# Patient Record
Sex: FEMALE | Race: WHITE
[De-identification: names, ages, dates, MRNs, and addresses within clinical notes are randomized per-mention and may not be internally consistent; named-entity substitution may affect disease eponyms.]

---

## 2017-05-15 ENCOUNTER — HOSPITAL ENCOUNTER (OUTPATIENT)
Dept: HOSPITAL 47 - RADMAMWWP | Age: 77
Discharge: HOME | End: 2017-05-15
Payer: MEDICARE

## 2017-05-15 DIAGNOSIS — Z12.31: Primary | ICD-10-CM

## 2017-05-15 DIAGNOSIS — Z78.0: ICD-10-CM

## 2017-05-15 DIAGNOSIS — M85.80: ICD-10-CM

## 2017-05-15 PROCEDURE — 77080 DXA BONE DENSITY AXIAL: CPT

## 2017-05-15 PROCEDURE — 77063 BREAST TOMOSYNTHESIS BI: CPT

## 2017-05-16 NOTE — BD
EXAMINATION TYPE: MG DEXA axial skeleton.  

 

DATE OF EXAM: 5/15/2017 3:06 PM

 

COMPARISON: NONE

 

CLINICAL HISTORY: 77-year-old female postmenopausal screening

 

Height:  66 IN

Weight:  154 LBS

 

FRAX RISK QUESTIONS:

Alcohol (3 or more units per day):  NO

Family History (Parent hip fracture):  YES MOTHER

Glucocorticoids (More than 3mos):  NO

           (Ex: prednisone, prednisolone, methylprednisolone, dexamethasone, and hydrocortisone).    
     

History of Fracture in Adulthood: NO

Secondary Osteoporosis:

  1.  Type 1 Diabetes: NO

  2.  Hyperthyroidism: NO

  3.  Menopause before 45: YES AGE 41

  4.  Malnutrition: NO

  5.  Chronic liver disease: NO

Rheumatoid Arthritis: NO

Current Tobacco Use: NO

 

RISK FACTORS 

HISTORY OF: 

Surgery to Spine): L SPINE

When: 

Family History of Osteoporosis: YES MOTHER/AUNT(M)

Active: YES

Diet low in dairy products/other sources of calcium:  YES

Postmenopausal woman: AGE 41

Take estrogen and/or progesterone medications: NOT NOW

How long: AGE 42 - 47

Lost more than 2 inches in height since high school: YES 3 "

 

 

 

MEDICATIONS: 

Thyroid Medications:  YES

Which medication: Levothyroxine

How Lon+ YRS

Additional Medications: CALCIUM, VIT D, LEVOTHYROXINE, ABILIFY, PAXIL, XANAX, PROPRANOLOL, BABY ASPIR
IN, CENTRUM SILVER 

 

 

 

EXAM MEASUREMENTS: 

Bone mineral densitometry was performed using the [employer].

PT HAD L-SPINE SURGERY IN 

 

Bone mineral density about the R hip (g/cm2): 0.842

Bone mineral density about the L hip (g/cm2): 0.794

T Score values are as follows:

-----R Neck: -1.4

-----L Neck: -1.8

-----R Total: -1.1

-----L Total: -1.4

Bone mineral density BASELINE

 

 

IMPRESSION:

Osteopenia as indicated by T score values in both hips.

 

There is slightly increased risk of fracture and the patient may be considered 

for treatment. Re-Screen 2-5 years.

 

 

 

 

 

NOTE:  T-SCORE=SD OF THE YOUNG ADULT MEAN.

## 2017-05-17 ENCOUNTER — HOSPITAL ENCOUNTER (OUTPATIENT)
Dept: HOSPITAL 47 - LABWHC1 | Age: 77
Discharge: HOME | End: 2017-05-17
Payer: MEDICARE

## 2017-05-17 DIAGNOSIS — R25.1: ICD-10-CM

## 2017-05-17 DIAGNOSIS — E03.9: Primary | ICD-10-CM

## 2017-05-17 DIAGNOSIS — Z13.1: ICD-10-CM

## 2017-05-17 DIAGNOSIS — E78.5: ICD-10-CM

## 2017-05-17 DIAGNOSIS — F06.4: ICD-10-CM

## 2017-05-17 LAB
ALP SERPL-CCNC: 75 U/L (ref 38–126)
ALT SERPL-CCNC: 36 U/L (ref 9–52)
ANION GAP SERPL CALC-SCNC: 9 MMOL/L
AST SERPL-CCNC: 41 U/L (ref 14–36)
BUN SERPL-SCNC: 11 MG/DL (ref 7–17)
CALCIUM SPEC-MCNC: 8.8 MG/DL (ref 8.4–10.2)
CH: 30.3
CHCM: 32.4
CHLORIDE SERPL-SCNC: 101 MMOL/L (ref 98–107)
CHOLEST SERPL-MCNC: 135 MG/DL (ref ?–200)
CO2 SERPL-SCNC: 28 MMOL/L (ref 22–30)
ERYTHROCYTE [DISTWIDTH] IN BLOOD BY AUTOMATED COUNT: 4.33 M/UL (ref 3.8–5.4)
ERYTHROCYTE [DISTWIDTH] IN BLOOD: 14.2 % (ref 11.5–15.5)
GLUCOSE SERPL-MCNC: 81 MG/DL (ref 74–99)
HCT VFR BLD AUTO: 40.8 % (ref 34–46)
HDLC SERPL-MCNC: 51 MG/DL (ref 40–60)
HDW: 2.24
HEMOGLOBIN A1C: 5.4 % (ref 4.2–6.1)
HGB BLD-MCNC: 13 GM/DL (ref 11.4–16)
MCH RBC QN AUTO: 30 PG (ref 25–35)
MCHC RBC AUTO-ENTMCNC: 31.9 G/DL (ref 31–37)
MCV RBC AUTO: 94.2 FL (ref 80–100)
NON-AFRICAN AMERICAN GFR(MDRD): >60
POTASSIUM SERPL-SCNC: 4 MMOL/L (ref 3.5–5.1)
PROT SERPL-MCNC: 6.8 G/DL (ref 6.3–8.2)
SODIUM SERPL-SCNC: 138 MMOL/L (ref 137–145)
TRIGL SERPL-MCNC: 54 MG/DL (ref ?–150)
WBC # BLD AUTO: 5.3 K/UL (ref 3.8–10.6)

## 2017-05-17 PROCEDURE — 83036 HEMOGLOBIN GLYCOSYLATED A1C: CPT

## 2017-05-17 PROCEDURE — 84443 ASSAY THYROID STIM HORMONE: CPT

## 2017-05-17 PROCEDURE — 80053 COMPREHEN METABOLIC PANEL: CPT

## 2017-05-17 PROCEDURE — 85027 COMPLETE CBC AUTOMATED: CPT

## 2017-05-17 PROCEDURE — 36415 COLL VENOUS BLD VENIPUNCTURE: CPT

## 2017-05-17 PROCEDURE — 80061 LIPID PANEL: CPT

## 2017-05-18 NOTE — MM
Reason for exam: screening  (asymptomatic).

Last mammogram was performed 1 year ago.



History:

Patient is postmenopausal, has history of high-risk lesion on a previous biopsy at

age 60, history of other cancer, and had first child at age 33.

High risk excisional biopsy of the left breast.  Benign excisional biopsy of the 

right breast.



Physical Findings:

A clinical breast exam by your physician is recommended on an annual basis and 

results should be correlated with mammographic findings.



MG 3D Screening Mammo W/Cad

Bilateral CC and MLO view(s) were taken.

Prior study comparison: May 4, 2016, bilateral MG 3d diag mammo w/cad ABELINO.  April 

3, 2015, mammogram.

The breast tissue is extremely dense which could obscure a lesion on mammography. 

Subtle distortion right medial inferior MLO view, may be present previously.





ASSESSMENT: Probably benign, BI-RAD 3



RECOMMENDATION:

Follow-up diagnostic mammogram of the right breast in 6 months.

## 2017-11-17 ENCOUNTER — HOSPITAL ENCOUNTER (OUTPATIENT)
Dept: HOSPITAL 47 - RADMAMWWP | Age: 77
Discharge: HOME | End: 2017-11-17
Attending: INTERNAL MEDICINE
Payer: MEDICARE

## 2017-11-17 DIAGNOSIS — R92.8: Primary | ICD-10-CM

## 2017-11-20 NOTE — MM
Reason for exam: follow-up at short interval from prior study.

Last mammogram was performed 6 months ago.



History:

Patient is postmenopausal, has history of high-risk lesion on a previous biopsy at

age 60, history of other cancer, and had first child at age 33.

High risk excisional biopsy of the left breast.  Benign excisional biopsy of the 

right breast.



Physical Findings:

Nurse did not find any significant physical abnormalities on exam.



MG 3D Diag Mammo W/Cad RT

CC and MLO view(s) were taken of the right breast.

Prior study comparison: May 15, 2017, bilateral MG 3d screening mammo w/cad.  May 

4, 2016, bilateral MG 3d diag mammo w/cad ABELINO.

The breast tissue is extremely dense which could obscure a lesion on mammography.

Finding #1: Stable architectural distortion in the right breast consistent with 

previous surgery.

Finding #2: There are typically benign calcifications in the right breast.



These results were verbally communicated with the patient and result sheet given 

to the patient on 11/17/17.





ASSESSMENT: Benign, BI-RAD 2



RECOMMENDATION:

Follow-up diagnostic mammogram of both breasts in 6 months.

## 2018-04-27 ENCOUNTER — HOSPITAL ENCOUNTER (EMERGENCY)
Dept: HOSPITAL 47 - EC | Age: 78
Discharge: HOME | End: 2018-04-27
Payer: MEDICARE

## 2018-04-27 VITALS
TEMPERATURE: 98 F | RESPIRATION RATE: 18 BRPM | SYSTOLIC BLOOD PRESSURE: 159 MMHG | DIASTOLIC BLOOD PRESSURE: 64 MMHG | HEART RATE: 89 BPM

## 2018-04-27 DIAGNOSIS — G45.4: Primary | ICD-10-CM

## 2018-04-27 DIAGNOSIS — R41.82: ICD-10-CM

## 2018-04-27 DIAGNOSIS — Z79.82: ICD-10-CM

## 2018-04-27 DIAGNOSIS — F32.9: ICD-10-CM

## 2018-04-27 DIAGNOSIS — Z79.899: ICD-10-CM

## 2018-04-27 LAB
ALBUMIN SERPL-MCNC: 4.2 G/DL (ref 3.5–5)
ALP SERPL-CCNC: 106 U/L (ref 38–126)
ALT SERPL-CCNC: 31 U/L (ref 9–52)
ANION GAP SERPL CALC-SCNC: 13 MMOL/L
APTT BLD: 23.9 SEC (ref 22–30)
AST SERPL-CCNC: 35 U/L (ref 14–36)
BASOPHILS # BLD AUTO: 0 K/UL (ref 0–0.2)
BASOPHILS NFR BLD AUTO: 0 %
BUN SERPL-SCNC: 20 MG/DL (ref 7–17)
CALCIUM SPEC-MCNC: 9.4 MG/DL (ref 8.4–10.2)
CHLORIDE SERPL-SCNC: 103 MMOL/L (ref 98–107)
CK SERPL-CCNC: 269 U/L (ref 30–135)
CO2 SERPL-SCNC: 27 MMOL/L (ref 22–30)
EOSINOPHIL # BLD AUTO: 0.2 K/UL (ref 0–0.7)
EOSINOPHIL NFR BLD AUTO: 2 %
ERYTHROCYTE [DISTWIDTH] IN BLOOD BY AUTOMATED COUNT: 4.99 M/UL (ref 3.8–5.4)
ERYTHROCYTE [DISTWIDTH] IN BLOOD: 14.3 % (ref 11.5–15.5)
GLUCOSE SERPL-MCNC: 101 MG/DL (ref 74–99)
HCT VFR BLD AUTO: 45 % (ref 34–46)
HGB BLD-MCNC: 14.7 GM/DL (ref 11.4–16)
INR PPP: 1.1 (ref ?–1.2)
LYMPHOCYTES # SPEC AUTO: 1.7 K/UL (ref 1–4.8)
LYMPHOCYTES NFR SPEC AUTO: 17 %
MCH RBC QN AUTO: 29.4 PG (ref 25–35)
MCHC RBC AUTO-ENTMCNC: 32.6 G/DL (ref 31–37)
MCV RBC AUTO: 90.1 FL (ref 80–100)
MONOCYTES # BLD AUTO: 0.5 K/UL (ref 0–1)
MONOCYTES NFR BLD AUTO: 4 %
NEUTROPHILS # BLD AUTO: 7.5 K/UL (ref 1.3–7.7)
NEUTROPHILS NFR BLD AUTO: 75 %
PLATELET # BLD AUTO: 275 K/UL (ref 150–450)
POTASSIUM SERPL-SCNC: 4.2 MMOL/L (ref 3.5–5.1)
PROT SERPL-MCNC: 7.1 G/DL (ref 6.3–8.2)
PT BLD: 10.4 SEC (ref 9–12)
SODIUM SERPL-SCNC: 143 MMOL/L (ref 137–145)
TROPONIN I SERPL-MCNC: 0.04 NG/ML (ref 0–0.03)
WBC # BLD AUTO: 10.1 K/UL (ref 3.8–10.6)

## 2018-04-27 PROCEDURE — 85730 THROMBOPLASTIN TIME PARTIAL: CPT

## 2018-04-27 PROCEDURE — 84484 ASSAY OF TROPONIN QUANT: CPT

## 2018-04-27 PROCEDURE — 93005 ELECTROCARDIOGRAM TRACING: CPT

## 2018-04-27 PROCEDURE — 70496 CT ANGIOGRAPHY HEAD: CPT

## 2018-04-27 PROCEDURE — 82553 CREATINE MB FRACTION: CPT

## 2018-04-27 PROCEDURE — 96360 HYDRATION IV INFUSION INIT: CPT

## 2018-04-27 PROCEDURE — 70498 CT ANGIOGRAPHY NECK: CPT

## 2018-04-27 PROCEDURE — 85025 COMPLETE CBC W/AUTO DIFF WBC: CPT

## 2018-04-27 PROCEDURE — 36415 COLL VENOUS BLD VENIPUNCTURE: CPT

## 2018-04-27 PROCEDURE — 70450 CT HEAD/BRAIN W/O DYE: CPT

## 2018-04-27 PROCEDURE — 80053 COMPREHEN METABOLIC PANEL: CPT

## 2018-04-27 PROCEDURE — 82550 ASSAY OF CK (CPK): CPT

## 2018-04-27 PROCEDURE — 99285 EMERGENCY DEPT VISIT HI MDM: CPT

## 2018-04-27 PROCEDURE — 85610 PROTHROMBIN TIME: CPT

## 2018-04-27 PROCEDURE — 96361 HYDRATE IV INFUSION ADD-ON: CPT

## 2018-04-27 NOTE — CT
EXAMINATION TYPE: CT brain wo con for TPA

 

DATE OF EXAM: 4/27/2018

 

COMPARISON: NONE

 

HISTORY: confusion

 

CT DLP: 1029.9 mGycm

 

Unenhanced CT of the brain was performed.  

 

The ventricles, basal cisterns and sulci overlying the cerebral convexities demonstrate mild enlargem
ent. 

 

There is no evidence for intracranial hemorrhage or sulcal effacement.  

 

There is decreased attenuation about the periventricular white matter and deep white matter of both c
erebral hemispheres, compatible with chronic small vessel ischemia. Differential diagnosis does inclu
de demyelination. 

 

No mass effects are seen.No midline shift.

 

Osseous calvarium is intact.  

 

If symptoms persist consider MRI.  

 

IMPRESSION:

 

1. Age related atrophic and chronic small vessel ischemic change without acute intracranial process s
een at this time.

## 2018-04-27 NOTE — CT
EXAMINATION TYPE: CT angio head neck

 

DATE OF EXAM: 4/27/2018

 

COMPARISON: NONE

 

HISTORY: ams, confusion, weakness

 

CT DLP: 319.3 mGycm

 

CONTRAST: 

Performed with IV Contrast, patient injected with 65 mL of Isovue 370.

 

Combination Contrast CTA cervical carotids and [place] CTA cervical carotids with 3-D recons
truction

 

Contrast CTA of the cervical carotids was performed 3-D reconstruction imaging obtained at a separate
 workstation.  

 

Right carotid system: Mild plaque is seen of the right common carotid artery.  There is mild plaque a
lso noted at the carotid bulb and proximal ICA.  No significant diameter reduction.  ECA is patent.  
Right vertebral artery appears unremarkable.  

 

Left carotid system: Mild plaque is seen of the left common carotid artery.  There is mild plaque als
o noted at the carotid bulb and proximal ICA.  No significant diameter reduction.  ECA is patent. Lef
t vertebral artery appears unremarkable. 

 

IMPRESSION: 

 

1. No significant diameter reduction to account for the patient's symptoms. 

 

CTA [place] with 3-D reconstruction

 

Contrast CTA of the [place] was performed 3-D reconstruction imaging obtained at a separate 
workstation.  

 

Vertebrobasilar system as well as intracranial portions of the internal carotid arteries and their ma
laina tributaries are patent.  I do not see evidence for sizable aneurysm or vascular malformation.  Pl
ease note MRI provides greater sensitivity and specificity.  Visualized brain appears grossly unremar
kable. 

 

IMPRESSION:

 

1. No significant abnormality.

## 2018-04-27 NOTE — ED
General Adult HPI





- General


Chief complaint: Neuro Symptoms/Deficit


Stated complaint: Memory Loss


Time Seen by Provider: 04/27/18 12:14


Source: family, RN notes reviewed, old records reviewed


Mode of arrival: wheelchair


Limitations: altered mental status





- History of Present Illness


Initial comments: 





This is a 78-year-old female the ER for evaluation per patient does say for 

evaluation regarding episode of memory loss.  Patient has history of colon 

amnesia unrelated known cause.  Patient has no high blood pressure cholesterol 

no diabetes of her breath.  Patient does admit to being mildly anxious 

currently.  Per family patient was left babysitting, when they returned home 

patient was having difficulty with memory unsure of where she was how she got 

there etc.  Patient currently having difficulty with current memory situation.  

Unable to remember any events of today.  In that fact patient's poor strain, 

denies headache denies any other complaints.  Patient does have resting right 

upper extremity tremor





- Related Data


 Home Medications











 Medication  Instructions  Recorded  Confirmed


 


ARIPiprazole [Abilify] 2 mg PO DAILY 04/27/18 04/27/18


 


ARIPiprazole [Abilify] 15 mg PO DAILY 04/27/18 04/27/18


 


Aspirin EC [Ecotrin Low Dose] 81 mg PO DAILY 04/27/18 04/27/18


 


Cholecalciferol [Vitamin D3] 1,000 unit PO DAILY 04/27/18 04/27/18


 


Levothyroxine Sodium [Synthroid] 25 mcg PO DAILY 04/27/18 04/27/18


 


PARoxetine HCL [Paxil Cr] 25 mg PO HS 04/27/18 04/27/18


 


Propranolol HCl [Propranolol HCl 60 mg PO DAILY 04/27/18 04/27/18





ER]   











 Allergies











Allergy/AdvReac Type Severity Reaction Status Date / Time


 


No Known Allergies Allergy   Verified 04/27/18 12:45














Review of Systems


ROS Statement: 


Those systems with pertinent positive or pertinent negative responses have been 

documented in the HPI.





ROS Other: All systems not noted in ROS Statement are negative.





Past Medical History


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


History of Any Multi-Drug Resistant Organisms: None Reported


Past Surgical History: Appendectomy, Hysterectomy


Additional Past Surgical History / Comment(s): cysts removed from breasts


Past Psychological History: Depression


Smoking Status: Never smoker


Past Alcohol Use History: None Reported


Past Drug Use History: None Reported





General Exam





- General Exam Comments


Initial Comments: 





NIH of 0, no focal neurological deficit, patient does have retrograde memory 

loss


Limitations: altered mental status


General appearance: alert, in no apparent distress


Head exam: Present: atraumatic, normocephalic, normal inspection


Eye exam: Present: normal appearance, PERRL, EOMI.  Absent: scleral icterus, 

conjunctival injection, periorbital swelling


ENT exam: Present: normal exam, mucous membranes moist


Neck exam: Present: normal inspection.  Absent: tenderness, meningismus, 

lymphadenopathy


Respiratory exam: Present: normal lung sounds bilaterally.  Absent: respiratory 

distress, wheezes, rales, rhonchi, stridor


Cardiovascular Exam: Present: regular rate, normal rhythm, normal heart sounds.

  Absent: systolic murmur, diastolic murmur, rubs, gallop, clicks


GI/Abdominal exam: Present: soft, normal bowel sounds.  Absent: distended, 

tenderness, guarding, rebound, rigid


Extremities exam: Present: normal inspection, full ROM, normal capillary 

refill.  Absent: tenderness, pedal edema, joint swelling, calf tenderness


Back exam: Present: normal inspection


Neurological exam: Present: alert, oriented X3, CN II-XII intact


Psychiatric exam: Present: normal affect, normal mood


Skin exam: Present: warm, dry, intact, normal color.  Absent: rash





Course


 Vital Signs











  04/27/18 04/27/18





  12:18 13:38


 


Temperature 98.6 F 


 


Pulse Rate 73 71


 


Respiratory 18 20





Rate  


 


Blood Pressure 184/89 180/87


 


O2 Sat by Pulse 99 99





Oximetry  














- Reevaluation(s)


Reevaluation #1: 





04/27/18 12:47


Code stroke page,


Reevaluation #2: 





04/27/18 14:09


Patient improving, still having mild memory lapse from earlier today





EKG Findings





- EKG Comments:


EKG Findings:: EKG shows normal sinus rhythm rate of 70, , QRS 86, QTc 403





Medical Decision Making





- Medical Decision Making





78 female the ER for evaluation, fall retrograde memory loss, transient global 

amnesia, CT CT negative labwork normal.  Patient will be discharged home





- Lab Data


Result diagrams: 


 04/27/18 12:25





 04/27/18 12:25


 Lab Results











  04/27/18 04/27/18 04/27/18 Range/Units





  12:25 12:25 12:25 


 


WBC   10.1   (3.8-10.6)  k/uL


 


RBC   4.99   (3.80-5.40)  m/uL


 


Hgb   14.7   (11.4-16.0)  gm/dL


 


Hct   45.0   (34.0-46.0)  %


 


MCV   90.1   (80.0-100.0)  fL


 


MCH   29.4   (25.0-35.0)  pg


 


MCHC   32.6   (31.0-37.0)  g/dL


 


RDW   14.3   (11.5-15.5)  %


 


Plt Count   275   (150-450)  k/uL


 


Neutrophils %   75   %


 


Lymphocytes %   17   %


 


Monocytes %   4   %


 


Eosinophils %   2   %


 


Basophils %   0   %


 


Neutrophils #   7.5   (1.3-7.7)  k/uL


 


Lymphocytes #   1.7   (1.0-4.8)  k/uL


 


Monocytes #   0.5   (0-1.0)  k/uL


 


Eosinophils #   0.2   (0-0.7)  k/uL


 


Basophils #   0.0   (0-0.2)  k/uL


 


PT     (9.0-12.0)  sec


 


INR     (<1.2)  


 


APTT     (22.0-30.0)  sec


 


Sodium    143  (137-145)  mmol/L


 


Potassium    4.2  (3.5-5.1)  mmol/L


 


Chloride    103  ()  mmol/L


 


Carbon Dioxide    27  (22-30)  mmol/L


 


Anion Gap    13  mmol/L


 


BUN    20 H  (7-17)  mg/dL


 


Creatinine    0.73  (0.52-1.04)  mg/dL


 


Est GFR (CKD-EPI)AfAm    >90  (>60 ml/min/1.73 sqM)  


 


Est GFR (CKD-EPI)NonAf    81  (>60 ml/min/1.73 sqM)  


 


Glucose    101 H  (74-99)  mg/dL


 


Calcium    9.4  (8.4-10.2)  mg/dL


 


Total Bilirubin    0.4  (0.2-1.3)  mg/dL


 


AST    35  (14-36)  U/L


 


ALT    31  (9-52)  U/L


 


Alkaline Phosphatase    106  ()  U/L


 


Total Creatine Kinase  269 H    ()  U/L


 


CK-MB (CK-2)  3.7 H*    (0.0-2.4)  ng/mL


 


CK-MB (CK-2) Rel Index  1.4    


 


Troponin I  0.038 H*    (0.000-0.034)  ng/mL


 


Total Protein    7.1  (6.3-8.2)  g/dL


 


Albumin    4.2  (3.5-5.0)  g/dL














  04/27/18 Range/Units





  12:25 


 


WBC   (3.8-10.6)  k/uL


 


RBC   (3.80-5.40)  m/uL


 


Hgb   (11.4-16.0)  gm/dL


 


Hct   (34.0-46.0)  %


 


MCV   (80.0-100.0)  fL


 


MCH   (25.0-35.0)  pg


 


MCHC   (31.0-37.0)  g/dL


 


RDW   (11.5-15.5)  %


 


Plt Count   (150-450)  k/uL


 


Neutrophils %   %


 


Lymphocytes %   %


 


Monocytes %   %


 


Eosinophils %   %


 


Basophils %   %


 


Neutrophils #   (1.3-7.7)  k/uL


 


Lymphocytes #   (1.0-4.8)  k/uL


 


Monocytes #   (0-1.0)  k/uL


 


Eosinophils #   (0-0.7)  k/uL


 


Basophils #   (0-0.2)  k/uL


 


PT  10.4  (9.0-12.0)  sec


 


INR  1.1  (<1.2)  


 


APTT  23.9  (22.0-30.0)  sec


 


Sodium   (137-145)  mmol/L


 


Potassium   (3.5-5.1)  mmol/L


 


Chloride   ()  mmol/L


 


Carbon Dioxide   (22-30)  mmol/L


 


Anion Gap   mmol/L


 


BUN   (7-17)  mg/dL


 


Creatinine   (0.52-1.04)  mg/dL


 


Est GFR (CKD-EPI)AfAm   (>60 ml/min/1.73 sqM)  


 


Est GFR (CKD-EPI)NonAf   (>60 ml/min/1.73 sqM)  


 


Glucose   (74-99)  mg/dL


 


Calcium   (8.4-10.2)  mg/dL


 


Total Bilirubin   (0.2-1.3)  mg/dL


 


AST   (14-36)  U/L


 


ALT   (9-52)  U/L


 


Alkaline Phosphatase   ()  U/L


 


Total Creatine Kinase   ()  U/L


 


CK-MB (CK-2)   (0.0-2.4)  ng/mL


 


CK-MB (CK-2) Rel Index   


 


Troponin I   (0.000-0.034)  ng/mL


 


Total Protein   (6.3-8.2)  g/dL


 


Albumin   (3.5-5.0)  g/dL














- Radiology Data


Radiology results: report reviewed (CT brain CTA head and neck negative), image 

reviewed





Disposition


Clinical Impression: 


 Transient global amnesia





Disposition: HOME SELF-CARE


Condition: Good


Instructions:  Transient Global Amnesia (ED)


Is patient prescribed a controlled substance at d/c from ED?: No


Referrals: 


Thanh Suárez MD [Primary Care Provider] - 1-2 days

## 2018-05-07 ENCOUNTER — HOSPITAL ENCOUNTER (OUTPATIENT)
Dept: HOSPITAL 47 - RADMAMWWP | Age: 78
Discharge: HOME | End: 2018-05-07
Attending: INTERNAL MEDICINE
Payer: MEDICARE

## 2018-05-07 DIAGNOSIS — R92.8: Primary | ICD-10-CM

## 2018-05-07 PROCEDURE — 77062 BREAST TOMOSYNTHESIS BI: CPT

## 2018-05-07 PROCEDURE — 77066 DX MAMMO INCL CAD BI: CPT

## 2018-05-08 NOTE — MM
Reason for exam: additional evaluation requested from prior study.

Last mammogram was performed 6 months ago.



History:

Patient is postmenopausal, has history of high-risk lesion on a previous biopsy at

age 60, history of other cancer, and had first child at age 33.

High risk excisional biopsy of the left breast.  Benign excisional biopsy of the 

right breast.



Physical Findings:

Nurse did not find any significant physical abnormalities on exam.



MG 3D Diag Mammo W/Cad ABELINO

Bilateral CC and MLO view(s) were taken.

Prior study comparison: November 17, 2017, right breast MG 3d diag mammo w/cad RT.

May 15, 2017, bilateral MG 3d screening mammo w/cad.

The breast tissue is extremely dense which could obscure a lesion on mammography.

Finding #1: There is stable architectural distortion in the outer quadrant of the 

right breast.

Finding #2: There are typically benign vascular, round calcifications in both 

breasts. There is no discrete abnormality.



These results were verbally communicated with the patient and result sheet given 

to the patient on 5/7/18.





ASSESSMENT: Benign, BI-RAD 2



RECOMMENDATION:

Routine screening mammogram of both breasts in 1 year.

## 2018-05-09 ENCOUNTER — HOSPITAL ENCOUNTER (OUTPATIENT)
Dept: HOSPITAL 47 - NEUROMAIN | Age: 78
End: 2018-05-09
Attending: INTERNAL MEDICINE
Payer: MEDICARE

## 2018-05-09 DIAGNOSIS — G45.4: Primary | ICD-10-CM

## 2018-05-09 PROCEDURE — 95819 EEG AWAKE AND ASLEEP: CPT

## 2018-05-09 NOTE — EEG
ELECTROENCEPHALOGRAM REPORT



DATE OF SERVICE:

05/09/2018.



REASON FOR TESTING:

Altered mental status.



DESCRIPTION OF THE PROCEDURE:

This EEG was performed using a 21 channel digital electroencephalograph, following

international 10-20 system.



DESCRIPTION OF THE RECORDING:

From the beginning of the tracing, with patient's eyes closed, the background rhythm

was mostly consisting of 9-10 Hz alpha frequency in the posterior occipital leads.  No

obvious asymmetry is seen.  Photic stimulation was performed with a minimal driving

response seen.  No pathological waves were elicited.  Hyperventilation was not

performed.  The patient remains awake throughout the tracing.  No epileptiform

discharges were seen.  Her EKG lead showed a regular rate and rhythm.



INTERPRETATION:

This awake EEG can be considered within normal limits.  There was no asymmetry seen.

No epileptiform discharges were noticed.  The absence of epileptiform discharges does

not rule out the diagnosis of epilepsy, therefore clinical correlation is recommended.



RECOMMENDATION:

Thank you, Dr. Suárez for allowing me to participate in the care of your patient.  If

you have any questions, please feel free to contact me.





MMEMERY / IJN: [id number] / Job#: [id number]

## 2018-05-25 ENCOUNTER — HOSPITAL ENCOUNTER (EMERGENCY)
Dept: HOSPITAL 47 - EC | Age: 78
Discharge: HOME | End: 2018-05-25
Payer: COMMERCIAL

## 2018-05-25 VITALS — RESPIRATION RATE: 18 BRPM | HEART RATE: 78 BPM | TEMPERATURE: 98.1 F

## 2018-05-25 VITALS — DIASTOLIC BLOOD PRESSURE: 78 MMHG | SYSTOLIC BLOOD PRESSURE: 168 MMHG
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
## 2018-05-25 DIAGNOSIS — R40.2412: ICD-10-CM
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
## 2018-05-25 DIAGNOSIS — G31.9: ICD-10-CM

## 2018-05-25 DIAGNOSIS — Z79.899: ICD-10-CM

## 2018-05-25 DIAGNOSIS — I10: ICD-10-CM

## 2018-05-25 DIAGNOSIS — S09.90XA: ICD-10-CM

## 2018-05-25 DIAGNOSIS — F32.9: ICD-10-CM

## 2018-05-25 DIAGNOSIS — S01.112A: Primary | ICD-10-CM

## 2018-05-25 DIAGNOSIS — E07.9: ICD-10-CM

## 2018-05-25 DIAGNOSIS — Z79.82: ICD-10-CM

## 2018-05-25 PROCEDURE — 70486 CT MAXILLOFACIAL W/O DYE: CPT

## 2018-05-25 PROCEDURE — 99284 EMERGENCY DEPT VISIT MOD MDM: CPT

## 2018-05-25 PROCEDURE — 70450 CT HEAD/BRAIN W/O DYE: CPT

## 2018-05-25 PROCEDURE — 12011 RPR F/E/E/N/L/M 2.5 CM/<: CPT

## 2018-05-25 PROCEDURE — 72125 CT NECK SPINE W/O DYE: CPT
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
## 2018-07-19 ENCOUNTER — HOSPITAL ENCOUNTER (OUTPATIENT)
Dept: HOSPITAL 47 - RADXRMAIN | Age: 78
Discharge: HOME | End: 2018-07-19
Payer: MEDICARE

## 2018-07-19 DIAGNOSIS — M79.602: ICD-10-CM

## 2018-07-19 DIAGNOSIS — M11.232: Primary | ICD-10-CM

## 2018-07-19 NOTE — XR
EXAMINATION TYPE: XR forearm LT

 

DATE OF EXAM: 7/19/2018

 

COMPARISON: NONE

 

HISTORY: Pain

 

TECHNIQUE: 2 views

 

FINDINGS: I see no fracture nor dislocation. Radius and ulna appear intact.

 

IMPRESSION: Negative left forearm exam.

## 2018-07-19 NOTE — XR
EXAMINATION TYPE: XR humerus LT

 

DATE OF EXAM: 7/19/2018

 

COMPARISON: NONE

 

HISTORY: Arm pain

 

TECHNIQUE: 2 views

 

FINDINGS: There is no sign of fracture nor dislocation. Shoulder joint and elbow joint appear intact.


 

IMPRESSION: Negative left humerus exam

## 2018-07-19 NOTE — XR
EXAMINATION TYPE: XR wrist complete LT

 

DATE OF EXAM: 7/19/2018

 

COMPARISON: NONE

 

HISTORY: Fall. Wrist pain.

 

TECHNIQUE: 4 views.

 

FINDINGS: I see no fracture nor dislocation. There is calcification of the triangular cartilage. Ther
e are no erosions.

 

IMPRESSION: No fracture. Chondrocalcinosis.

## 2019-01-18 ENCOUNTER — HOSPITAL ENCOUNTER (OUTPATIENT)
Dept: HOSPITAL 47 - RADXRMAIN | Age: 79
Discharge: HOME | End: 2019-01-18
Attending: INTERNAL MEDICINE
Payer: COMMERCIAL

## 2019-01-18 DIAGNOSIS — S82.402D: Primary | ICD-10-CM

## 2019-01-18 NOTE — XR
EXAMINATION TYPE: XR tibia fibula LT

 

DATE OF EXAM: 1/18/2019

 

CLINICAL HISTORY: Fracture

 

TECHNIQUE:  Two views of the left leg are obtained.

 

COMPARISON: None at this location

 

FINDINGS:

 

There is a healing fracture with callus formation at the distal diaphyseal left fibula. Joint spaces 
appear preserved. Joint prosthesis is noted at the knee. Soft tissues are unremarkable.

 

IMPRESSION:

1. Healing fracture distal diaphyseal fibula.

## 2019-02-04 ENCOUNTER — HOSPITAL ENCOUNTER (OUTPATIENT)
Dept: HOSPITAL 47 - RADUSMAIN | Age: 79
Discharge: HOME | End: 2019-02-04
Attending: ORTHOPAEDIC SURGERY
Payer: COMMERCIAL

## 2019-02-04 DIAGNOSIS — R60.9: ICD-10-CM

## 2019-02-04 DIAGNOSIS — M25.572: Primary | ICD-10-CM

## 2019-02-04 NOTE — US
EXAMINATION TYPE: US venous doppler duplex LE LT

 

DATE OF EXAM: 2/4/2019 6:21 PM

 

COMPARISON: NONE

 

CLINICAL HISTORY: M25.572 lle pain and swelling. Left leg pain.

 

SIDE PERFORMED: Left  

 

TECHNIQUE:  The lower extremity deep venous system is examined utilizing real time linear array sonog
sebas with graded compression, doppler sonography and color-flow sonography.

 

VESSELS IMAGED:

External Iliac Vein (EIV)

Common Femoral Vein

Deep Femoral Vein

Greater Saphenous Vein *

Femoral Vein

Popliteal Vein

Small Saphenous Vein *

Proximal Calf Veins

(* superficial vessels)

 

 

Left Leg:  Negative for DVT

 

Grayscale, color doppler, spectral doppler imaging performed of the deep veins of the left lower extr
emity.  There is normal flow, compressibility, vascular waveforms.

 

IMPRESSION:  No ultrasound evidence for acute DVT in the left lower extremity.

## 2019-02-19 ENCOUNTER — HOSPITAL ENCOUNTER (OUTPATIENT)
Dept: HOSPITAL 47 - RADFLMAIN | Age: 79
Discharge: HOME | End: 2019-02-19
Attending: OTOLARYNGOLOGY
Payer: COMMERCIAL

## 2019-02-19 DIAGNOSIS — J39.2: Primary | ICD-10-CM

## 2019-02-19 PROCEDURE — 74230 X-RAY XM SWLNG FUNCJ C+: CPT

## 2019-04-15 ENCOUNTER — HOSPITAL ENCOUNTER (OUTPATIENT)
Dept: HOSPITAL 47 - RADUSWWP | Age: 79
Discharge: HOME | End: 2019-04-15
Attending: ORTHOPAEDIC SURGERY
Payer: MEDICARE

## 2019-04-15 DIAGNOSIS — M79.662: Primary | ICD-10-CM

## 2019-04-15 DIAGNOSIS — R60.9: ICD-10-CM

## 2019-04-15 DIAGNOSIS — I80.3: ICD-10-CM

## 2019-04-15 DIAGNOSIS — S82.62XD: ICD-10-CM

## 2019-04-15 NOTE — US
EXAMINATION TYPE: US venous doppler duplex LE LT

 

DATE OF EXAM: 4/15/2019 4:11 PM

 

COMPARISON: Left lower extremity venous ultrasound February 4, 2019

 

CLINICAL HISTORY: M25.572/R60.9/S82.62XD/Evaluate DVT.

 

SIDE PERFORMED: Left  

 

TECHNIQUE:  The lower extremity deep venous system is examined utilizing real time linear array sonog
sebas with graded compression, doppler sonography and color-flow sonography.

 

VESSELS IMAGED:

External Iliac Vein (EIV)

Common Femoral Vein

Deep Femoral Vein

Greater Saphenous Vein *

Femoral Vein

Popliteal Vein

Small Saphenous Vein *

Proximal Calf Veins

(* superficial vessels)

 

 

 

Left Leg:  Appears negative for DVT.

Preliminary given to Megan at 4:15.

 

Grayscale, color doppler, spectral doppler imaging performed of the deep veins of the left lower extr
emity.  There is normal flow, compressibility, vascular waveforms.

 

IMPRESSION:  No ultrasound evidence for acute DVT in the left lower extremity. No significant change 
from prior.

## 2019-07-01 ENCOUNTER — HOSPITAL ENCOUNTER (OUTPATIENT)
Dept: HOSPITAL 47 - RADMAMWWP | Age: 79
Discharge: HOME | End: 2019-07-01
Attending: INTERNAL MEDICINE
Payer: MEDICARE

## 2019-07-01 DIAGNOSIS — Z12.31: Primary | ICD-10-CM

## 2019-07-01 PROCEDURE — 77063 BREAST TOMOSYNTHESIS BI: CPT

## 2019-07-01 PROCEDURE — 77067 SCR MAMMO BI INCL CAD: CPT

## 2019-07-03 NOTE — MM
Reason for exam: screening  (asymptomatic).

Last mammogram was performed 1 year and 2 months ago.



History:

Patient is postmenopausal, has history of high-risk lesion on a previous biopsy at

age 60, history of other cancer, and had first child at age 33.

High risk excisional biopsy of the left breast.  Benign excisional biopsy of the 

right breast.



Physical Findings:

A clinical breast exam by your physician is recommended on an annual basis and 

results should be correlated with mammographic findings.



MG 3D Screening Mammo W/Cad

Bilateral CC and MLO view(s) were taken.

Prior study comparison: May 7, 2018, bilateral MG 3d diag mammo w/cad AB[facility]INO.  

November 17, 2017, right breast MG 3d diag mammo w/cad RT.

The breast tissue is heterogeneously dense. This may lower the sensitivity of 

mammography.  No significant changes when compared with prior studies.





ASSESSMENT: Negative, BI-RAD 1



RECOMMENDATION:

Routine screening mammogram of both breasts in 1 year.

Patient should continue monthly self breast exams.

A negative report should not preclude additional follow up of suspicious palpable 

abnormalities.

## 2019-07-22 ENCOUNTER — HOSPITAL ENCOUNTER (OUTPATIENT)
Dept: HOSPITAL 47 - RADBDWWP | Age: 79
Discharge: HOME | End: 2019-07-22
Attending: INTERNAL MEDICINE
Payer: MEDICARE

## 2019-07-22 DIAGNOSIS — Z78.0: ICD-10-CM

## 2019-07-22 DIAGNOSIS — M85.88: Primary | ICD-10-CM

## 2019-07-22 PROCEDURE — 77080 DXA BONE DENSITY AXIAL: CPT
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
## 2020-08-17 ENCOUNTER — HOSPITAL ENCOUNTER (OUTPATIENT)
Dept: HOSPITAL 47 - RADMAMWWP | Age: 80
Discharge: HOME | End: 2020-08-17
Payer: MEDICARE

## 2020-08-17 DIAGNOSIS — Z12.31: Primary | ICD-10-CM

## 2020-08-17 PROCEDURE — 77063 BREAST TOMOSYNTHESIS BI: CPT

## 2020-08-17 PROCEDURE — 77067 SCR MAMMO BI INCL CAD: CPT

## 2020-08-19 NOTE — MM
Reason for exam: screening  (asymptomatic).

Last mammogram was performed 1 year and 2 months ago.



History:

Patient is postmenopausal, has history of high-risk lesion on a previous biopsy at

age 60, history of other cancer, and had first child at age 33.

High risk excisional biopsy of the left breast.  Benign excisional biopsy of the 

right breast.



Physical Findings:

A clinical breast exam by your physician is recommended on an annual basis and 

results should be correlated with mammographic findings.



MG 3D Screening Mammo W/Cad

Bilateral CC and MLO view(s) were taken.

Prior study comparison: July 1, 2019, bilateral MG 3d screening mammo w/cad.  May 

7, 2018, bilateral MG 3d diag mammo w/cad [facility].

The breast tissue is extremely dense which could obscure a lesion on mammography. 

Benign appearing bilateral calcifications.  No significant changes when compared 

with prior studies.





ASSESSMENT: Benign, BI-RAD 2



RECOMMENDATION:

Routine screening mammogram of both breasts in 1 year.

## 2020-09-10 ENCOUNTER — HOSPITAL ENCOUNTER (OUTPATIENT)
Dept: HOSPITAL 47 - RADCTMAIN | Age: 80
Discharge: HOME | End: 2020-09-10
Payer: MEDICARE

## 2020-09-10 DIAGNOSIS — G31.1: ICD-10-CM

## 2020-09-10 DIAGNOSIS — I67.82: Primary | ICD-10-CM

## 2020-09-10 DIAGNOSIS — R41.0: ICD-10-CM

## 2020-09-10 DIAGNOSIS — Z20.828: ICD-10-CM

## 2020-09-10 PROCEDURE — 70450 CT HEAD/BRAIN W/O DYE: CPT
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
## 2020-11-03 ENCOUNTER — HOSPITAL ENCOUNTER (OUTPATIENT)
Dept: HOSPITAL 47 - RADNMMAIN | Age: 80
Discharge: HOME | End: 2020-11-03
Attending: INTERNAL MEDICINE
Payer: MEDICARE

## 2020-11-03 DIAGNOSIS — R07.9: Primary | ICD-10-CM

## 2020-11-03 PROCEDURE — 93351 STRESS TTE COMPLETE: CPT

## 2020-11-03 NOTE — ECHOS
STRESS ECHOCARDIOGRAM



BASELINE HEART RATE:

70



BASELINE BLOOD PRESSURE:

133/74



MAXIMUM HEART RATE:

134



MAXIMUM BLOOD PRESSURE:

152/55



85% MPHR:

121



100% MPHR:

142



MAXIMUM STAGE REACHED:

3



TOTAL EXERCISE TIME:

7:54



CLINICAL INFORMATION:

Baseline EKG revealed normal sinus rhythm without significant ST changes.  With

dobutamine administration as per protocol, the heart rate went up to 134 beats per

minute which is well above 85% of predicted maximal.  She did not have any significant

symptoms.  EKG did not reveal any ST-segment changes to indicate ischemia.  Upsloping

nonspecific ST-segment changes with rare PVCs were noted.  By EKG criteria, this is

unremarkable dobutamine stress test with a heart rate of more than 85% of the predicted

maximal.



This is unremarkable dobutamine stress test by EKG criteria.



Baseline echo images revealed normal wall motion wall thickening of all segments.



With dobutamine administration, there was progressive increase in contractility of all

segments suggesting that there is no evidence of stress-induced ischemia on this study.



FINAL IMPRESSION:

1. By EKG criteria, this is an unremarkable dobutamine stress test without any

    evidence of ischemia or angina.

2. Normal dobutamine stress echocardiogram.  There is no evidence to suggest stress

    induced ischemia on this study.





MMODL / IJN: [id number] / Job#: [id number]

## 2020-11-09 ENCOUNTER — HOSPITAL ENCOUNTER (OUTPATIENT)
Dept: HOSPITAL 47 - RADMRIMAIN | Age: 80
Discharge: HOME | End: 2020-11-09
Attending: PSYCHIATRY & NEUROLOGY
Payer: COMMERCIAL

## 2020-11-09 DIAGNOSIS — M43.8X2: ICD-10-CM

## 2020-11-09 DIAGNOSIS — M47.12: ICD-10-CM

## 2020-11-09 DIAGNOSIS — M43.12: Primary | ICD-10-CM

## 2020-11-09 DIAGNOSIS — M43.13: ICD-10-CM

## 2020-11-09 PROCEDURE — 72141 MRI NECK SPINE W/O DYE: CPT

## 2020-11-09 NOTE — MR
MRI CERVICAL SPINE:

 

CLINICAL HISTORY: Hyperreflexia, abnormal gait, and myelopathy all per order. Neck pain for 2 years.

 

TECHNIQUE: Multiplanar, multisequence imaging of the cervical spine is performed without IV contrast.


 

COMPARISON: CT cervical spine March 25, 2018.

 

FINDINGS: Sagittal images of the cervical spine show the craniocervical junction to appear within nor
mal limits. Slight grade 1 anterolisthesis C4 on C5 along with C7 on T1 is redemonstrated. Exaggerate
d cervical curvature redemonstrated. Persistent moderate disc space narrowing C5-C6 and C6-C7 levels.
 Mild to moderate anterior spurring at these levels. Vertebral body heights maintained.  The bone mar
row signal intensity is within normal limits.

 

Axial images show the C2-C3 level to appear within normal limits.

 

Axial images at C3-C4 level show uncovertebral facet degenerative changes bilaterally causing mild-to
-moderate right greater than left bilateral neural foraminal narrowing.

 

Axial images at C4-C5 level shows spondylolisthesis without vertebral facet degenerative changes bila
terally, patent bilateral neural foramina.

 

Axial images at C5-C6 level broad-based right paracentral disc protrusion effacing anterior thecal sa
c. Up to the ventral surface of spinal cord, patent bilateral neural foramina.

 

Axial images at C6-C7 level broad-based central disc protrusion effacing the anterior thecal sac up t
o the ventral surface of spinal cord axial image 15, patent bilateral neural foramina.

 

Axial images at C7-T1 level show spondylolisthesis, spinal canal is preserved. The bilateral neural f
oramina.

 

IMPRESSION: Exaggerated cervical curvature with multilevel spondylolisthesis and degenerative changes
 as detailed above

## 2020-12-26 ENCOUNTER — HOSPITAL ENCOUNTER (EMERGENCY)
Dept: HOSPITAL 47 - EC | Age: 80
Discharge: HOME | End: 2020-12-26
Payer: MEDICARE

## 2020-12-26 VITALS — HEART RATE: 88 BPM | DIASTOLIC BLOOD PRESSURE: 77 MMHG | RESPIRATION RATE: 18 BRPM | SYSTOLIC BLOOD PRESSURE: 156 MMHG

## 2020-12-26 VITALS — TEMPERATURE: 98.7 F

## 2020-12-26 DIAGNOSIS — E07.9: ICD-10-CM

## 2020-12-26 DIAGNOSIS — F41.9: ICD-10-CM

## 2020-12-26 DIAGNOSIS — F31.9: ICD-10-CM

## 2020-12-26 DIAGNOSIS — Z79.890: ICD-10-CM

## 2020-12-26 DIAGNOSIS — I10: Primary | ICD-10-CM

## 2020-12-26 DIAGNOSIS — Z79.82: ICD-10-CM

## 2020-12-26 DIAGNOSIS — Z91.013: ICD-10-CM

## 2020-12-26 DIAGNOSIS — Z88.0: ICD-10-CM

## 2020-12-26 DIAGNOSIS — Z79.899: ICD-10-CM

## 2020-12-26 DIAGNOSIS — Z88.8: ICD-10-CM

## 2020-12-26 DIAGNOSIS — Z88.5: ICD-10-CM

## 2020-12-26 DIAGNOSIS — F43.10: ICD-10-CM

## 2020-12-26 LAB
ALBUMIN SERPL-MCNC: 3.9 G/DL (ref 3.5–5)
ALP SERPL-CCNC: 95 U/L (ref 38–126)
ALT SERPL-CCNC: 28 U/L (ref 4–34)
ANION GAP SERPL CALC-SCNC: 5 MMOL/L
APTT BLD: 23.7 SEC (ref 22–30)
AST SERPL-CCNC: 34 U/L (ref 14–36)
BASOPHILS # BLD AUTO: 0.1 K/UL (ref 0–0.2)
BASOPHILS NFR BLD AUTO: 1 %
BUN SERPL-SCNC: 13 MG/DL (ref 7–17)
CALCIUM SPEC-MCNC: 9 MG/DL (ref 8.4–10.2)
CHLORIDE SERPL-SCNC: 104 MMOL/L (ref 98–107)
CO2 SERPL-SCNC: 31 MMOL/L (ref 22–30)
EOSINOPHIL # BLD AUTO: 0.3 K/UL (ref 0–0.7)
EOSINOPHIL NFR BLD AUTO: 4 %
ERYTHROCYTE [DISTWIDTH] IN BLOOD BY AUTOMATED COUNT: 4.62 M/UL (ref 3.8–5.4)
ERYTHROCYTE [DISTWIDTH] IN BLOOD: 14 % (ref 11.5–15.5)
GLUCOSE SERPL-MCNC: 90 MG/DL (ref 74–99)
HCT VFR BLD AUTO: 42.7 % (ref 34–46)
HGB BLD-MCNC: 14.3 GM/DL (ref 11.4–16)
INR PPP: 0.9 (ref ?–1.2)
LYMPHOCYTES # SPEC AUTO: 1.4 K/UL (ref 1–4.8)
LYMPHOCYTES NFR SPEC AUTO: 23 %
MAGNESIUM SPEC-SCNC: 2.2 MG/DL (ref 1.6–2.3)
MCH RBC QN AUTO: 31 PG (ref 25–35)
MCHC RBC AUTO-ENTMCNC: 33.5 G/DL (ref 31–37)
MCV RBC AUTO: 92.4 FL (ref 80–100)
MONOCYTES # BLD AUTO: 0.5 K/UL (ref 0–1)
MONOCYTES NFR BLD AUTO: 8 %
NEUTROPHILS # BLD AUTO: 3.8 K/UL (ref 1.3–7.7)
NEUTROPHILS NFR BLD AUTO: 62 %
PLATELET # BLD AUTO: 264 K/UL (ref 150–450)
POTASSIUM SERPL-SCNC: 3.8 MMOL/L (ref 3.5–5.1)
PROT SERPL-MCNC: 6.7 G/DL (ref 6.3–8.2)
PT BLD: 9.5 SEC (ref 9–12)
SODIUM SERPL-SCNC: 140 MMOL/L (ref 137–145)
WBC # BLD AUTO: 6.2 K/UL (ref 3.8–10.6)

## 2020-12-26 PROCEDURE — 85610 PROTHROMBIN TIME: CPT

## 2020-12-26 PROCEDURE — 71046 X-RAY EXAM CHEST 2 VIEWS: CPT

## 2020-12-26 PROCEDURE — 85025 COMPLETE CBC W/AUTO DIFF WBC: CPT

## 2020-12-26 PROCEDURE — 83735 ASSAY OF MAGNESIUM: CPT

## 2020-12-26 PROCEDURE — 99284 EMERGENCY DEPT VISIT MOD MDM: CPT

## 2020-12-26 PROCEDURE — 96374 THER/PROPH/DIAG INJ IV PUSH: CPT

## 2020-12-26 PROCEDURE — 85730 THROMBOPLASTIN TIME PARTIAL: CPT

## 2020-12-26 PROCEDURE — 80053 COMPREHEN METABOLIC PANEL: CPT

## 2020-12-26 PROCEDURE — 36415 COLL VENOUS BLD VENIPUNCTURE: CPT

## 2020-12-26 PROCEDURE — 84484 ASSAY OF TROPONIN QUANT: CPT

## 2020-12-26 NOTE — XR
EXAMINATION TYPE: XR chest 2V

 

DATE OF EXAM: 12/26/2020

 

COMPARISON: 11/15/2016

 

HISTORY: Chest pain

 

TECHNIQUE:

 

FINDINGS: Heart is normal. Lungs are clear of consolidation. There are no hilar masses. Costophrenic 
angles are clear. The bony thorax is intact.

 

IMPRESSION: No active cardiopulmonary disease. No change.

## 2020-12-26 NOTE — ED
General Adult HPI





- General


Chief complaint: Recheck/Abnormal Lab/Rx


Stated complaint: High BP


Time Seen by Provider: 12/26/20 12:55


Source: patient, family, RN notes reviewed, old records reviewed


Mode of arrival: wheelchair


Limitations: no limitations





- History of Present Illness


Initial comments: 





This is a 72-year-old female presents emergency department stating that her 

blood pressure was elevated she came to the emergency room.  Patient states she 

had no headache she denies any blurred vision.  Patient denies any chest pain 

palpitations difficulty breathing shortness of breath.  Patient states she 

called her primary medical care doctor he did recommend her to come to the 

emergency department.  Patient states she is asymptomatic but her blood pressure

systolic was over 200 and diastolic was over 100 at home.  Patient denies any 

recent fever chills or cough.





- Related Data


                                Home Medications











 Medication  Instructions  Recorded  Confirmed


 


Aspirin EC [Ecotrin Low Dose] 81 mg PO DAILY@1200 04/27/18 12/26/20


 


Levothyroxine Sodium [Synthroid] 25 mcg PO AC-BRKFST 04/27/18 12/26/20


 


PARoxetine HCL [Paxil Cr] 25 mg PO HS 04/27/18 12/26/20


 


Multivit-Min/Iron/Folic/Lutein 1 tab PO DAILY@1600 05/25/18 12/26/20





[Centrum Silver Women Tablet]   


 


ALPRAZolam [Xanax] 0.0625 - 0.125 mg PO BID PRN 12/26/20 12/26/20


 


ARIPiprazole [Abilify] 15 mg PO HS 12/26/20 12/26/20


 


Acetaminophen Tab [Tylenol Tab] 500 mg PO Q6H PRN 12/26/20 12/26/20


 


Carbidopa-Levodopa  mg 1 tab PO TID@0500,1000,1500 12/26/20 12/26/20





[Sinemet ]   


 


Divalproex ER [Depakote ER] 250 mg PO HS 12/26/20 12/26/20


 


Meloxicam [Mobic] 15 mg PO DAILY 12/26/20 12/26/20


 


Omega-3/Dha/Epa/Fish Oil [Fish Oil 1 cap PO BID@1200,1600 12/26/20 12/26/20





500 mg Softgel]   


 


Psyllium Silver Capsule 5 cap PO AC-BID 12/26/20 12/26/20


 


Sennosides-Docusate Sodium 1 tab PO DAILY 12/26/20 12/26/20





[Senokot-S]   


 


amLODIPine [Norvasc] 5 mg PO DAILY 12/26/20 12/26/20


 


lamoTRIgine [LaMICtal] 300 mg PO HS 12/26/20 12/26/20











                                    Allergies











Allergy/AdvReac Type Severity Reaction Status Date / Time


 


acetaminophen [From Percocet] Allergy  Unknown Verified 12/26/20 15:20


 


oxycodone [From Percocet] Allergy  Unknown Verified 12/26/20 15:20


 


Penicillins Allergy  Rash/Hives Verified 12/26/20 15:20


 


propoxyphene [From Darvon] Allergy  Unknown Verified 12/26/20 15:20


 


shellfish derived [Shellfish] Allergy  Anaphylaxis Verified 12/26/20 15:20














Review of Systems


ROS Statement: 


Those systems with pertinent positive or pertinent negative responses have been 

documented in the HPI.





ROS Other: All systems not noted in ROS Statement are negative.





Past Medical History


Past Medical History: Hypertension, Thyroid Disorder


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


History of Any Multi-Drug Resistant Organisms: None Reported


Past Surgical History: Adenoidectomy, Appendectomy, Hysterectomy, Joint Re

placement, Orthopedic Surgery, Tonsillectomy


Additional Past Surgical History / Comment(s): cysts removed from breasts


Past Psychological History: Anxiety, Bipolar, Depression, PTSD


Smoking Status: Never smoker


Past Alcohol Use History: None Reported


Past Drug Use History: None Reported





General Exam





- General Exam Comments


Initial Comments: 





GENERAL:


Patient is well-developed and well-nourished.  Patient is nontoxic and well-

hydrated and is in mild distress.





ENT:


Neck is soft and supple.  No significant lymphadenopathy is noted.  Oropharynx 

is clear.  Moist mucous membranes.  Neck has full range of motion without 

eliciting any pain.  





EYES:


The sclera were anicteric and conjunctiva were pink and moist.  Extraocular 

movements were intact and pupils were equal round and reactive to light.  

Eyelids were unremarkable.





PULMONARY:


Unlabored respirations.  Good breath sounds bilaterally.  No audible rales 

rhonchi or wheezing was noted.





CARDIOVASCULAR:


There is a regular rate and rhythm without any murmurs gallops or rubs.  





ABDOMEN:


Soft and nontender with normal bowel sounds.  





SKIN:


Skin is clear with no lesions or rashes and otherwise unremarkable.





NEUROLOGIC:


Patient is alert and oriented x3.  Cranial nerves II through XII are grossly 

intact.  Motor and sensory are also intact.  Normal speech, volume and content. 

 Symmetrical smile. 





MUSCULOSKELETAL:


Normal extremities with adequate strength and full range of motion. 





LYMPHATICS:


No significant lymphadenopathy is noted





PSYCHIATRIC:


Normal psychiatric evaluation.  


Limitations: no limitations





Course


                                   Vital Signs











  12/26/20 12/26/20 12/26/20





  12:52 13:41 15:33


 


Temperature 98.7 F  


 


Pulse Rate 104 H  88


 


Respiratory 20  18





Rate   


 


Blood Pressure 186/93 155/83 156/77


 


O2 Sat by Pulse 97  98





Oximetry   














Medical Decision Making





- Medical Decision Making





ekg shows normal sinus rhythm at a rate of 89bpm. OK interval is 180. QRS is 86.

 QT is 368. QTc is 447.





Patient received hydralazine 10 mg IV push.  Her sugar came down to 155/78 and 

she remained asymptomatic.





- Lab Data


Result diagrams: 


                                 12/26/20 13:38





                                 12/26/20 13:38


                                   Lab Results











  12/26/20 12/26/20 12/26/20 Range/Units





  13:38 13:38 13:38 


 


WBC  6.2    (3.8-10.6)  k/uL


 


RBC  4.62    (3.80-5.40)  m/uL


 


Hgb  14.3    (11.4-16.0)  gm/dL


 


Hct  42.7    (34.0-46.0)  %


 


MCV  92.4    (80.0-100.0)  fL


 


MCH  31.0    (25.0-35.0)  pg


 


MCHC  33.5    (31.0-37.0)  g/dL


 


RDW  14.0    (11.5-15.5)  %


 


Plt Count  264    (150-450)  k/uL


 


MPV  7.2    


 


Neutrophils %  62    %


 


Lymphocytes %  23    %


 


Monocytes %  8    %


 


Eosinophils %  4    %


 


Basophils %  1    %


 


Neutrophils #  3.8    (1.3-7.7)  k/uL


 


Lymphocytes #  1.4    (1.0-4.8)  k/uL


 


Monocytes #  0.5    (0-1.0)  k/uL


 


Eosinophils #  0.3    (0-0.7)  k/uL


 


Basophils #  0.1    (0-0.2)  k/uL


 


PT   9.5   (9.0-12.0)  sec


 


INR   0.9   (<1.2)  


 


APTT   23.7   (22.0-30.0)  sec


 


Sodium    140  (137-145)  mmol/L


 


Potassium    3.8  (3.5-5.1)  mmol/L


 


Chloride    104  ()  mmol/L


 


Carbon Dioxide    31 H  (22-30)  mmol/L


 


Anion Gap    5  mmol/L


 


BUN    13  (7-17)  mg/dL


 


Creatinine    0.66  (0.52-1.04)  mg/dL


 


Est GFR (CKD-EPI)AfAm    >90  (>60 ml/min/1.73 sqM)  


 


Est GFR (CKD-EPI)NonAf    85  (>60 ml/min/1.73 sqM)  


 


Glucose    90  (74-99)  mg/dL


 


Calcium    9.0  (8.4-10.2)  mg/dL


 


Magnesium    2.2  (1.6-2.3)  mg/dL


 


Total Bilirubin    0.4  (0.2-1.3)  mg/dL


 


AST    34  (14-36)  U/L


 


ALT    28  (4-34)  U/L


 


Alkaline Phosphatase    95  ()  U/L


 


Troponin I     (0.000-0.034)  ng/mL


 


Total Protein    6.7  (6.3-8.2)  g/dL


 


Albumin    3.9  (3.5-5.0)  g/dL














  12/26/20 Range/Units





  13:38 


 


WBC   (3.8-10.6)  k/uL


 


RBC   (3.80-5.40)  m/uL


 


Hgb   (11.4-16.0)  gm/dL


 


Hct   (34.0-46.0)  %


 


MCV   (80.0-100.0)  fL


 


MCH   (25.0-35.0)  pg


 


MCHC   (31.0-37.0)  g/dL


 


RDW   (11.5-15.5)  %


 


Plt Count   (150-450)  k/uL


 


MPV   


 


Neutrophils %   %


 


Lymphocytes %   %


 


Monocytes %   %


 


Eosinophils %   %


 


Basophils %   %


 


Neutrophils #   (1.3-7.7)  k/uL


 


Lymphocytes #   (1.0-4.8)  k/uL


 


Monocytes #   (0-1.0)  k/uL


 


Eosinophils #   (0-0.7)  k/uL


 


Basophils #   (0-0.2)  k/uL


 


PT   (9.0-12.0)  sec


 


INR   (<1.2)  


 


APTT   (22.0-30.0)  sec


 


Sodium   (137-145)  mmol/L


 


Potassium   (3.5-5.1)  mmol/L


 


Chloride   ()  mmol/L


 


Carbon Dioxide   (22-30)  mmol/L


 


Anion Gap   mmol/L


 


BUN   (7-17)  mg/dL


 


Creatinine   (0.52-1.04)  mg/dL


 


Est GFR (CKD-EPI)AfAm   (>60 ml/min/1.73 sqM)  


 


Est GFR (CKD-EPI)NonAf   (>60 ml/min/1.73 sqM)  


 


Glucose   (74-99)  mg/dL


 


Calcium   (8.4-10.2)  mg/dL


 


Magnesium   (1.6-2.3)  mg/dL


 


Total Bilirubin   (0.2-1.3)  mg/dL


 


AST   (14-36)  U/L


 


ALT   (4-34)  U/L


 


Alkaline Phosphatase   ()  U/L


 


Troponin I  <0.012  (0.000-0.034)  ng/mL


 


Total Protein   (6.3-8.2)  g/dL


 


Albumin   (3.5-5.0)  g/dL














Disposition


Clinical Impression: 


 Hypertension





Disposition: HOME SELF-CARE


Condition: Good


Instructions (If sedation given, give patient instructions):  Hypertension (ED)


Is patient prescribed a controlled substance at d/c from ED?: No


Referrals: 


Flor Gonsales MD [Primary Care Provider] - 1-2 days


Time of Disposition: 15:32

## 2021-01-01 ENCOUNTER — HOSPITAL ENCOUNTER (EMERGENCY)
Dept: HOSPITAL 47 - EC | Age: 81
Discharge: HOME | End: 2021-01-01
Payer: MEDICARE

## 2021-01-01 VITALS — TEMPERATURE: 98.4 F

## 2021-01-01 VITALS — HEART RATE: 94 BPM | DIASTOLIC BLOOD PRESSURE: 81 MMHG | RESPIRATION RATE: 16 BRPM | SYSTOLIC BLOOD PRESSURE: 152 MMHG

## 2021-01-01 DIAGNOSIS — Z79.82: ICD-10-CM

## 2021-01-01 DIAGNOSIS — Z88.0: ICD-10-CM

## 2021-01-01 DIAGNOSIS — F31.9: ICD-10-CM

## 2021-01-01 DIAGNOSIS — I10: Primary | ICD-10-CM

## 2021-01-01 DIAGNOSIS — F43.10: ICD-10-CM

## 2021-01-01 DIAGNOSIS — Z88.5: ICD-10-CM

## 2021-01-01 DIAGNOSIS — F41.9: ICD-10-CM

## 2021-01-01 DIAGNOSIS — Z79.890: ICD-10-CM

## 2021-01-01 DIAGNOSIS — E07.9: ICD-10-CM

## 2021-01-01 DIAGNOSIS — Z79.899: ICD-10-CM

## 2021-01-01 DIAGNOSIS — Z88.8: ICD-10-CM

## 2021-01-01 DIAGNOSIS — Z91.013: ICD-10-CM

## 2021-01-01 PROCEDURE — 99283 EMERGENCY DEPT VISIT LOW MDM: CPT

## 2021-01-01 NOTE — ED
Recheck HPI





- General


Chief Complaint: Recheck/Abnormal Lab/Rx


Stated Complaint: Hypertension


Time Seen by Provider: 01/01/21 19:15


Source: patient


Mode of arrival: wheelchair


Limitations: no limitations





- History of Present Illness


Initial Comments: 





Patient is a 80-year-old female with history of hypertension, presenting to the 

emergency Department with complaints of elevated blood pressure today.  Patient 

states she has been monitoring her blood pressure and takes it 4 times daily.  

She has been on Norvasc 5 mg in the morning but was recently switched to 10 mg 

in the evenings 3 days ago.  Patient was seen in the ER 5 days ago and evaluated

for the same thing.  She's had normal labs and workup then.  Patient states this

evening when she took it was in the 190s over 102 so she got concerned and came 

into the ER.  She denies having any symptoms at this time, no headaches, no 

blurry vision.  She denies any other symptoms other then the elevated blood 

pressure.  She doesn't history of anxiety.  She has no other complaints at this 

time.  Upon arrival to the ER, her blood pressure is 198/82, it was rechecked 20

minutes later and it was 170/86.  The rest of her vitals are completely normal.





- Related Data


                                Home Medications











 Medication  Instructions  Recorded  Confirmed


 


Aspirin EC [Ecotrin Low Dose] 81 mg PO DAILY@1200 04/27/18 12/26/20


 


Levothyroxine Sodium [Synthroid] 25 mcg PO AC-BRKFST 04/27/18 12/26/20


 


PARoxetine HCL [Paxil Cr] 25 mg PO HS 04/27/18 12/26/20


 


Multivit-Min/Iron/Folic/Lutein 1 tab PO DAILY@1600 05/25/18 12/26/20





[Centrum Silver Women Tablet]   


 


ALPRAZolam [Xanax] 0.0625 - 0.125 mg PO BID PRN 12/26/20 12/26/20


 


ARIPiprazole [Abilify] 15 mg PO HS 12/26/20 12/26/20


 


Acetaminophen Tab [Tylenol Tab] 500 mg PO Q6H PRN 12/26/20 12/26/20


 


Carbidopa-Levodopa  mg 1 tab PO TID@0500,1000,1500 12/26/20 12/26/20





[Sinemet ]   


 


Divalproex ER [Depakote ER] 250 mg PO HS 12/26/20 12/26/20


 


Meloxicam [Mobic] 15 mg PO DAILY 12/26/20 12/26/20


 


Omega-3/Dha/Epa/Fish Oil [Fish Oil 1 cap PO BID@1200,1600 12/26/20 12/26/20





500 mg Softgel]   


 


Psyllium Silver Capsule 5 cap PO AC-BID 12/26/20 12/26/20


 


Sennosides-Docusate Sodium 1 tab PO DAILY 12/26/20 12/26/20





[Senokot-S]   


 


lamoTRIgine [LaMICtal] 300 mg PO HS 12/26/20 12/26/20


 


amLODIPine [Norvasc] 10 mg PO HS 01/01/21 01/01/21











                                    Allergies











Allergy/AdvReac Type Severity Reaction Status Date / Time


 


acetaminophen [From Percocet] Allergy  Unknown Verified 01/01/21 20:26


 


oxycodone [From Percocet] Allergy  Unknown Verified 01/01/21 20:26


 


Penicillins Allergy  Rash/Hives Verified 01/01/21 20:26


 


propoxyphene [From Darvon] Allergy  Unknown Verified 01/01/21 20:26


 


shellfish derived [Shellfish] Allergy  Anaphylaxis Verified 01/01/21 20:26














Review of Systems


ROS Statement: 


Those systems with pertinent positive or pertinent negative responses have been 

documented in the HPI.





ROS Other: All systems not noted in ROS Statement are negative.





Past Medical History


Past Medical History: Hypertension, Thyroid Disorder


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


History of Any Multi-Drug Resistant Organisms: None Reported


Past Surgical History: Adenoidectomy, Appendectomy, Hysterectomy, Joint 

Replacement, Orthopedic Surgery, Tonsillectomy


Additional Past Surgical History / Comment(s): cysts removed from breasts


Past Psychological History: Anxiety, Bipolar, Depression, PTSD


Smoking Status: Never smoker


Past Alcohol Use History: None Reported


Past Drug Use History: None Reported





General Exam





- General Exam Comments


Initial Comments: 





GENERAL: 


Patient is well-developed and well-nourished.  Patient is nontoxic and in no 

acute distress.





HEAD: 


Atraumatic, normocephalic.





EYES:


Pupils equal round and reactive to light, extraocular movements intact, sclera 

anicteric, conjunctiva are normal.  Eyelids were unremarkable.





ENT: 


TMs normal, nares patent, oropharynx clear without exudates.  Moist mucous 

membranes.





NECK: 


Normal range of motion, supple without lymphadenopathy or JVD.





LUNGS:


Unlabored respirations.  Breath sounds clear to auscultation bilaterally and 

equal.  No wheezes rales or rhonchi.





HEART:


Regular rate and rhythm without murmurs, rubs or gallops.





ABDOMEN: 


Soft, nontender, normoactive bowel sounds.  No guarding, no rebound.  No masses 

appreciated.





: Deferred 





MUSCULOSKELETAL: 


Normal extremities with adequate strength and normal range of motion, no pitting

 or edema.  No clubbing or cyanosis.





NEUROLOGICAL: 


Patient is alert and oriented x 3.  Motor and sensory are also intact.  Cranial 

nerves II through XII grossly intact.  Symmetrical smile.  Normal speech, normal

 gait.   





PSYCH:


Normal mood, normal affect.





SKIN:


 Warm, Dry, normal turgor, no rashes or lesions noted.


Limitations: no limitations





Course


                                   Vital Signs











  01/01/21 01/01/21 01/01/21





  19:03 19:21 20:09


 


Temperature 98.4 F  


 


Pulse Rate 103 H 99 94


 


Respiratory 18 18 16





Rate   


 


Blood Pressure 198/82 178/86 152/81


 


O2 Sat by Pulse 97 99 97





Oximetry   














Medical Decision Making





- Medical Decision Making





Patient is 80-year-old female with history of hypertension presenting with 

complaints of elevated blood pressure at home just prior to arrival.  Her blood 

pressure is 190/82 upon arrival, was rechecked 20 minutes later and it was 

178/86.  Patient is asymptomatic, her exam is unremarkable.  She was recently 

switched to 10 mg of Norvasc in the evenings.  She has not yet taken her evening

 dose.  Patient's blood pressure was rechecked about a half an hour later it is 

152/81.  She has remained asymptomatic.  I discussed with patient that she can 

go home and take her normal dose of medicine.  She can follow up with her PCP.  

She is in agreement this plan of care.  Return parameters were discussed with 

the patient and she verbalized understanding.  Case discussed with Dr. Palmer. 





Disposition


Clinical Impression: 


 Hypertension





Disposition: HOME SELF-CARE


Condition: Stable


Instructions (If sedation given, give patient instructions):  Hypertension (ED)


Additional Instructions: 


Please return to the Emergency Department if symptoms worsen or any other 

concerns.


Continue with your already prescribed medications.  


Follow up with PCP.


Is patient prescribed a controlled substance at d/c from ED?: No


Referrals: 


Maskoni,Bashar, MD [Primary Care Provider] - 1-2 days

## 2021-04-09 ENCOUNTER — HOSPITAL ENCOUNTER (OUTPATIENT)
Dept: HOSPITAL 47 - LABWHC1 | Age: 81
Discharge: HOME | End: 2021-04-09
Attending: INTERNAL MEDICINE
Payer: MEDICARE

## 2021-04-09 DIAGNOSIS — Z20.822: Primary | ICD-10-CM

## 2021-06-15 ENCOUNTER — HOSPITAL ENCOUNTER (OUTPATIENT)
Dept: HOSPITAL 47 - RADXRMAIN | Age: 81
Discharge: HOME | End: 2021-06-15
Attending: FAMILY MEDICINE
Payer: MEDICARE

## 2021-06-15 DIAGNOSIS — M17.11: Primary | ICD-10-CM

## 2021-06-15 DIAGNOSIS — M11.261: ICD-10-CM

## 2021-06-15 NOTE — XR
EXAMINATION TYPE: XR knee complete RT

 

DATE OF EXAM: 6/15/2021

 

CLINICAL HISTORY: pain, no injury

 

TECHNIQUE:  Three views of the right knee are obtained.

 

COMPARISON: None.

 

FINDINGS:  There is tricompartmental osteophytosis and subchondral sclerosis.  There is medial and la
teral chondrocalcinosis.  Knee joint effusion is present.  No acute fracture or dislocation is seen.

 

IMPRESSION:  

Tricompartmental degenerative changes are seen with medial and lateral chondrocalcinosis and knee susanna
nt effusion.

## 2021-11-12 ENCOUNTER — HOSPITAL ENCOUNTER (OUTPATIENT)
Dept: HOSPITAL 47 - LABWHC1 | Age: 81
Discharge: HOME | End: 2021-11-12
Payer: MEDICARE

## 2021-11-12 DIAGNOSIS — F31.81: Primary | ICD-10-CM

## 2021-11-12 PROCEDURE — 36415 COLL VENOUS BLD VENIPUNCTURE: CPT

## 2021-11-12 PROCEDURE — 80164 ASSAY DIPROPYLACETIC ACD TOT: CPT

## 2021-11-12 PROCEDURE — 80076 HEPATIC FUNCTION PANEL: CPT

## 2021-11-13 LAB
ALBUMIN SERPL-MCNC: 4.1 G/DL (ref 3.8–4.9)
ALBUMIN/GLOB SERPL: 1.58 G/DL (ref 1.6–3.17)
ALP SERPL-CCNC: 97 U/L (ref 41–126)
ALT SERPL-CCNC: 16 U/L (ref 8–44)
AST SERPL-CCNC: 25 U/L (ref 13–35)
GLOBULIN SER CALC-MCNC: 2.6 G/DL (ref 1.6–3.3)
PROT SERPL-MCNC: 6.7 G/DL (ref 6.2–8.2)

## 2021-11-29 ENCOUNTER — HOSPITAL ENCOUNTER (OUTPATIENT)
Dept: HOSPITAL 47 - RADMAMWWP | Age: 81
Discharge: HOME | End: 2021-11-29
Attending: INTERNAL MEDICINE
Payer: MEDICARE

## 2021-11-29 DIAGNOSIS — Z85.89: ICD-10-CM

## 2021-11-29 DIAGNOSIS — Z78.0: ICD-10-CM

## 2021-11-29 DIAGNOSIS — R92.1: ICD-10-CM

## 2021-11-29 DIAGNOSIS — Z12.31: Primary | ICD-10-CM

## 2021-11-29 DIAGNOSIS — M85.89: ICD-10-CM

## 2021-11-29 PROCEDURE — 77080 DXA BONE DENSITY AXIAL: CPT

## 2021-11-29 PROCEDURE — 77063 BREAST TOMOSYNTHESIS BI: CPT

## 2021-11-29 PROCEDURE — 77067 SCR MAMMO BI INCL CAD: CPT

## 2021-11-30 NOTE — MM
Reason for exam: screening  (asymptomatic).

Last mammogram was performed 1 year and 3 months ago.



History:

Patient is postmenopausal, has history of high-risk lesion on a previous biopsy at

age 60, history of other cancer, and had first child at age 33.

High risk excisional biopsy of the left breast.  Benign excisional biopsy of the 

right breast.

Took hormonal contraceptives for 10 years.  Took estrogen for 5 years.  Took 

antineoplastic for 5 years.



Physical Findings:

A clinical breast exam by your physician is recommended on an annual basis and 

results should be correlated with mammographic findings.



MG 3D Screening Mammo W/Cad

Bilateral CC and MLO view(s) were taken.

Prior study comparison: August 17, 2020, bilateral MG 3d screening mammo w/cad.  

July 1, 2019, bilateral MG 3d screening mammo w/cad.

The breast tissue is extremely dense which could obscure a lesion on mammography.

Finding #1: There is stable architectural distortion in the left breast consistent

with known excisional changes.

Finding #2: There are typically benign vascular, round calcifications in both 

breasts. There is no discrete abnormality.





ASSESSMENT: Benign, BI-RAD 2



RECOMMENDATION:

Routine screening mammogram of both breasts in 1 year.

## 2022-01-12 ENCOUNTER — HOSPITAL ENCOUNTER (OUTPATIENT)
Dept: HOSPITAL 47 - DBWHC3 | Age: 82
End: 2022-01-12
Attending: INTERNAL MEDICINE
Payer: MEDICARE

## 2022-01-12 VITALS — BODY MASS INDEX: 29.9 KG/M2

## 2022-01-12 DIAGNOSIS — R25.1: ICD-10-CM

## 2022-01-12 DIAGNOSIS — F31.9: ICD-10-CM

## 2022-01-12 DIAGNOSIS — Z79.899: ICD-10-CM

## 2022-01-12 DIAGNOSIS — K21.9: ICD-10-CM

## 2022-01-12 DIAGNOSIS — E78.5: ICD-10-CM

## 2022-01-12 DIAGNOSIS — Z88.5: ICD-10-CM

## 2022-01-12 DIAGNOSIS — F41.9: ICD-10-CM

## 2022-01-12 DIAGNOSIS — Z88.6: ICD-10-CM

## 2022-01-12 DIAGNOSIS — E03.9: ICD-10-CM

## 2022-01-12 DIAGNOSIS — F43.10: ICD-10-CM

## 2022-01-12 DIAGNOSIS — Z88.0: ICD-10-CM

## 2022-01-12 DIAGNOSIS — M19.90: ICD-10-CM

## 2022-01-12 DIAGNOSIS — Z91.013: ICD-10-CM

## 2022-01-12 DIAGNOSIS — I10: ICD-10-CM

## 2022-01-12 DIAGNOSIS — Z79.890: ICD-10-CM

## 2022-01-12 DIAGNOSIS — R63.5: Primary | ICD-10-CM

## 2022-01-12 PROCEDURE — 97802 MEDICAL NUTRITION INDIV IN: CPT

## 2022-01-19 ENCOUNTER — HOSPITAL ENCOUNTER (OUTPATIENT)
Dept: HOSPITAL 47 - LABWHC1 | Age: 82
Discharge: HOME | End: 2022-01-19
Attending: PSYCHIATRY & NEUROLOGY
Payer: MEDICARE

## 2022-01-19 DIAGNOSIS — F31.81: Primary | ICD-10-CM

## 2022-01-19 PROCEDURE — 36415 COLL VENOUS BLD VENIPUNCTURE: CPT

## 2022-01-19 PROCEDURE — 80164 ASSAY DIPROPYLACETIC ACD TOT: CPT

## 2022-03-16 ENCOUNTER — HOSPITAL ENCOUNTER (OUTPATIENT)
Dept: HOSPITAL 47 - RADUSWWP | Age: 82
Discharge: HOME | End: 2022-03-16
Attending: INTERNAL MEDICINE
Payer: MEDICARE

## 2022-03-16 DIAGNOSIS — K80.20: Primary | ICD-10-CM

## 2022-03-16 DIAGNOSIS — K76.89: ICD-10-CM

## 2022-03-16 PROCEDURE — 76705 ECHO EXAM OF ABDOMEN: CPT

## 2022-03-16 NOTE — US
EXAMINATION TYPE: US gallbladder

 

DATE OF EXAM: 3/16/2022

 

COMPARISON: NONE

 

CLINICAL HISTORY: R10.13 DYSEPSIA. Pt states acid reflux

 

EXAM MEASUREMENTS:

 

Liver Length:  14.3 cm   

Gallbladder Wall:  0.2 cm   

CBD:  0.3 cm

Right Kidney:  10.3 x 4.2 x 4.4 cm

 

 

 

Pancreas:  Head wnl, body and tail obscured by overlying bowel gas

Liver:  Cyst right lobe= 1.9 x 2.0 x 2.2 cm, otherwise appeared wnl  

Gallbladder:  A few possible small "gravel" stones

**Evidence for sonographic Boss's sign:  No

CBD:  wnl 

Right Kidney:  wnl, lower pole gassed out 

 

 

 

IMPRESSION:

1. Uncomplicated cholelithiasis.

2. Cyst right hepatic lobe as noted.

## 2022-11-15 ENCOUNTER — HOSPITAL ENCOUNTER (OUTPATIENT)
Dept: HOSPITAL 47 - RADMAMWWP | Age: 82
Discharge: HOME | End: 2022-11-15
Attending: INTERNAL MEDICINE
Payer: MEDICARE

## 2022-11-15 DIAGNOSIS — Z78.0: ICD-10-CM

## 2022-11-15 DIAGNOSIS — N63.21: Primary | ICD-10-CM

## 2022-11-15 DIAGNOSIS — Z98.890: ICD-10-CM

## 2022-11-15 PROCEDURE — 76642 ULTRASOUND BREAST LIMITED: CPT

## 2022-11-15 PROCEDURE — 77066 DX MAMMO INCL CAD BI: CPT

## 2022-11-15 PROCEDURE — 77062 BREAST TOMOSYNTHESIS BI: CPT

## 2022-11-15 NOTE — USB
Reason for Exam: Clinical finding. 





Patient History: 

Menarche at age 14. First Full-Term Pregnancy at age 33. Late child-bearing (after 30). Hysterectomy

at age 41. Postmenopausal. Patient has history of breast feeding. Patient used Estrogen for 5 years.

Patient used Hormonal Contraceptives for 10 years. Benign Excisional Biopsy on the right side. High

risk Excisional Biopsy on the left side. 





Risk Values: 

Alexa 5 year model risk: 3.4%.

NCI Lifetime model risk: 5.2%.

Technique: 

Method: Targeted.  





Prior Study Comparison: 

7/1/2019 Bilateral Screening Mammogram, [facility]. 8/17/2020 Bilateral Screening Mammogram, [facility].

11/29/2021 Bilateral Screening Mammogram, [facility]. 





Findings: 

The lateral section of the breast of the left breast and the axilla of the left breast were scanned.



Ultrasound of the left breast from 1-5 o'clock with additional evaluation of the axilla was

obtained. No solid or cystic lesions identified. 





Overall Assessment: Negative, BI-RAD 1





Management: 

Screening Mammogram of both breasts in 1 year.

A clinical breast exam by your physician is recommended on an annual basis and results should be

correlated with mammographic findings.  This exam should not preclude additional follow-up of

suspicious palpable abnormalities.  Results were given to the patient verbally at the time of exam.



Electronically signed and approved by: Rah Farmer D.O.

## 2022-11-15 NOTE — MM
Reason for Exam: Clinical finding. 

Last screening mammogram was performed 12 month(s) ago.

Indicated Problems: 

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





Patient History: 

Menarche at age 14. First Full-Term Pregnancy at age 33. Late child-bearing (after 30). Hysterectomy

at age 41. Postmenopausal. Patient has history of breast feeding. Patient used Estrogen for 5 years.

Patient used Hormonal Contraceptives for 10 years. Benign Excisional Biopsy on the right side. High

risk Excisional Biopsy on the left side. 





Risk Values: 

Alexa 5 year model risk: 3.4%.

NCI Lifetime model risk: 5.2%.





Prior Study Comparison: 

3/8/2013  Screening Mammogram, Unknown. 3/10/2014  Screening Mammogram, Unknown. 4/3/2015  Screening

Mammogram, Unknown. 5/15/2017 Bilateral Screening Mammogram, [facility]. 5/7/2018 Bilateral Diagnostic

Mammogram, [facility]. 7/1/2019 Bilateral Screening Mammogram, [facility]. 8/17/2020 Bilateral Screening

Mammogram, [facility]. 11/29/2021 Bilateral Screening Mammogram, [facility]. 





Tissue Density: 

The breast tissue is heterogeneously dense. This may lower the sensitivity of mammography.





Findings: 

Analyzed By CAD. 

No suspicious new mass or worrisome cluster microcalcifications within either breast.

Benign-appearing round calcifications within the breast. No significant change from prior exams.

Patient reports left axillary pain.



No suspicious new mass or worrisome cluster microcalcifications within either breast.

Benign-appearing round calcifications within the breast. No significant change from prior exams.

Patient reports left axillary and lateral breast tenderness. 





Overall Assessment: Incomplete: need additional imaging evaluation, BI-RAD 0





Management: 

Diagnostic Breast Ultrasound of the left breast.

A clinical breast exam by your physician is recommended on an annual basis and results should be

correlated with mammographic findings.  This exam should not preclude additional follow-up of

suspicious palpable abnormalities.  Results were given to the patient verbally at the time of exam.



Electronically signed and approved by: Rah Farmer D.O.
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
## 2023-07-31 ENCOUNTER — HOSPITAL ENCOUNTER (OUTPATIENT)
Dept: HOSPITAL 47 - RADUSWWP | Age: 83
Discharge: HOME | End: 2023-07-31
Attending: INTERNAL MEDICINE
Payer: MEDICARE

## 2023-07-31 DIAGNOSIS — I65.23: Primary | ICD-10-CM

## 2023-07-31 DIAGNOSIS — I10: ICD-10-CM

## 2023-07-31 PROCEDURE — 93880 EXTRACRANIAL BILAT STUDY: CPT

## 2023-07-31 NOTE — US
EXAMINATION TYPE: US carotid duplex BILAT

 

DATE OF EXAM: 7/31/2023

 

COMPARISON:

 

CLINICAL INDICATION: Female, 83 years old with history of I65.23 CAROTID STENOSIS; Dizziness.  HTN co
ntrolled with meds. 

 

TECHNIQUE: Carotid duplex ultrasound examination. Indirect Doppler criteria was utilized.

 

FINDINGS:

 

EXAM MEASUREMENTS: 

 

RIGHT:  Peak Systolic Velocity (PSV) cm/sec

----- Right CCA:  86.7  

----- Right ICA:  101.6     

----- Right ECA:  112.9   

ICA/CCA ratio:  1.2    

 

RIGHT:  End Diastole cm/sec

----- Right CCA:  15.3   

----- Right ICA:  20.8      

----- Right ECA:  12.8     

 

LEFT:  Peak Systolic Velocity (PSV) cm/sec

----- Left CCA:  82.3  

----- Left ICA:  88.6   

----- Left ECA:  82.7  

ICA/CCA ratio:  1.1  

 

LEFT:  End Diastole cm/sec

----- Left CCA:  13.1  

----- Left ICA:  28.1   

----- Left ECA:  0.0 

 

VERTEBRALS (direction of flow):

Right Vertebral: Antegrade

Left Vertebral: Antegrade

 

Rhythm:  Normal

 

SONOGRAPHER NOTES: No plaque or significant stenosis. Elevated proximal left proximal CCA velocity.  


 

 

 

IMPRESSION:  

Less than 50% stenosis of the bilateral carotid bifurcations.

 

 

 

 

Criteria for Assigning % of Stenosis / Diameter reduction

(Estimation based on the indirect measurements of the internal carotid artery velocities (ICA PSV).

1.  Normal (no stenosis)=ICA PSV < 125 cm/s: ratio < 2.0: ICA EDV<40 cm/s.

2. Less than 50% stenosis=ICA PSV < 125 cm/s: ratio < 2.0: ICA EDV<40 cm/s.

3.  50 to 69% stenosis=ICA PSV of 125 to 230 cm/s: ration 2.0 ? 4.0: ICA EDV  cm/s.

4.  Greater than 70% stenosis to near occlusion= ICA PSV > 230 cm/s: ratio > 4.0: ICA EDV > 100 cm/s.
 

5.  Near occlusion= ICA PSV velocities may be low or undetectable: variable ratio and ICA EDV.

6.  Total occlusion=unable to detect flow.

## 2023-11-17 ENCOUNTER — HOSPITAL ENCOUNTER (OUTPATIENT)
Dept: HOSPITAL 47 - RADMAMWWP | Age: 83
Discharge: HOME | End: 2023-11-17
Attending: INTERNAL MEDICINE
Payer: MEDICARE

## 2023-11-17 DIAGNOSIS — Z78.0: ICD-10-CM

## 2023-11-17 DIAGNOSIS — Z12.31: Primary | ICD-10-CM

## 2023-11-17 DIAGNOSIS — M85.89: ICD-10-CM

## 2023-11-17 PROCEDURE — 77063 BREAST TOMOSYNTHESIS BI: CPT

## 2023-11-17 PROCEDURE — 77080 DXA BONE DENSITY AXIAL: CPT

## 2023-11-17 PROCEDURE — 77067 SCR MAMMO BI INCL CAD: CPT

## 2023-11-17 NOTE — BD
EXAMINATION TYPE: Axial Bone Density

 

DATE OF EXAM: 2023

 

CLINICAL HISTORY: 83 years old Female.  ICD-10 CODE: M85.851 DISORDER OF BONE

 

Height:  64.5

Weight:  176

 

FRAX RISK QUESTIONS:

Family History (Parent hip fracture):  yes, mother

History of Fracture in Adulthood: yes, L & T spine, ribs, lt ankle

Secondary Osteoporosis: no

Rheumatoid Arthritis: no

 

RISK FACTORS 

HISTORY OF: 

Spine Fracture: yes 

When: 2018

Surgery to Spine: yes

When:  

Family History of Osteoporosis: yes, mother

Active: yes

Diet low in dairy products/other sources of calcium:  no

Postmenopausal woman: yes

Lost more than 2 inches in height since high school: yes was 71

Frequent falls: no

Poor Health: no

 

 

MEDICATIONS: 

Thyroid Medications:  yes

Which medication: Levothyroxine

How Lon+ yrs..

Additional Medications: yes

hbp meds, depression meds

 

Additional History: yes

calcium and vit d

 

EXAM MEASUREMENTS: 

Bone mineral densitometry was performed using the [employer].

Bone mineral density as measured about the Lumbar spine is:  

----- L1-L4(G/cm2): 1.387

T Score Values are as follows:

----- L1: -0.4

----- L2: 1.7

----- L3: 2.1

----- L4: 3.0

----- L1-L4: 1.7

 

Z Score Values are as follows:

----- L1: 1.0

----- L2: 3.1

----- L3: 3.4

----- L4: 4.3

----- L1-L4: 3.1

 

Bone mineral density baseline

 

Bone mineral density about the R hip (g/cm2): 0.829

Bone mineral density about the L hip (g/cm2): 0.851

T Score values are as follows:

-----R Neck: -1.4

-----L Neck: -1.6

-----R Total: -1.4

-----L Total: -1.2

 

Z Score values are as follows:

-----R Neck: 0.5

-----L Neck: 0.3

-----R Total: 0.3

-----L Total: 0.5

 

Bone mineral density has: Increased 1.4% since study of: 2021

 

 

FRAX%s: The graph provided illustrates a 32.6% chance for a major osteoporotic fx and a 18.7% chance 
for the hips probability for fx in 10 years time.

 

 

 

 

 

IMPRESSION:

Osteopenia (T Score between -2.5 and -1).

 

There is slightly increased risk of fracture and the patient may be considered 

for treatment. 

 

Re-Screen 2-5 years.

 

NOTE:  T-SCORE=SD OF THE YOUNG ADULT MEAN.

## 2023-11-20 NOTE — MM
Reason for Exam: Screening  (asymptomatic). 

Last screening mammogram was performed 12 month(s) ago.





Patient History: 

Menarche at age 14. First Full-Term Pregnancy at age 33. Late child-bearing (after 32). Hysterectomy

at age 41. Postmenopausal. Patient has history of breast feeding. Patient used Estrogen for 5 years.

Patient used Hormonal Contraceptives for 10 years. Benign Excisional Biopsy on the right side. High

risk Excisional Biopsy on the left side. 





Risk Values: 

Alexa 5 year model risk: 3.3%.

NCI Lifetime model risk: 4.8%.





Prior Study Comparison: 

8/17/2020 Bilateral Screening Mammogram, [facility]. 11/29/2021 Bilateral Screening Mammogram, [facility].

11/15/2022 Bilateral MG 3D diag mammo w/cad [facility], [facility]. 





Tissue Density: 

The breast tissue is heterogeneously dense. This may lower the sensitivity of mammography.





Findings: 

Analyzed By CAD. 

There is no suspicious group of microcalcifications or new suspicious mass in either breast. 





Overall Assessment: Benign, BI-RAD 2





Management: 

Screening Mammogram of both breasts in 1 year.

.



Patient should continue monthly self-breast exams.  A clinical breast exam by your physician is

recommended on an annual basis.

This exam should not preclude additional follow-up of suspicious palpable abnormalities.



Note on Alexa scores and lifetime risk:

1. A Alexa score greater than 3% is considered moderate risk. If this is the case, consider

specialist referral to assess eligibility for a risk reducing agent.

2. If overall lifetime risk for the development of breast cancer is 20% or higher, the patient may

qualify for future screening with alternating mammogram and breast MRI.



Electronically signed and approved by: Leopold M. Fregoli, M.D. Radiologis

## 2023-12-15 ENCOUNTER — HOSPITAL ENCOUNTER (OUTPATIENT)
Dept: HOSPITAL 47 - RADECHMAIN | Age: 83
Discharge: HOME | End: 2023-12-15
Attending: INTERNAL MEDICINE
Payer: MEDICARE

## 2023-12-15 DIAGNOSIS — I34.0: Primary | ICD-10-CM

## 2023-12-15 PROCEDURE — 93306 TTE W/DOPPLER COMPLETE: CPT

## 2023-12-15 NOTE — CA
Transthoracic Echo Report 

 Name: Marsha Meyer 

 MRN:    [id number] 

 Age:    83     Gender:     F 

 

 :    1940 

 Exam Date:     12/15/2023 13:10 

 Exam Location: [facility] 

 Ht (in):     65     Wt (lb):     173 

 Ordering Physician:        Marbella Charles MD 

 Attending/Referring Phys: 

 Technician         Reshma Vasquez RDCS 

 Procedure CPT: 

 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 

 

 Cardiac Hx: 

 Technical Quality:      Fair 

 Contrast 1:                                Total Dose (mL): 

 Contrast 2:                                Total Dose (mL): 

 

 MEASUREMENTS  (Male / Female) Normal Values 

 2D ECHO 

 LV Diastolic Diameter PLAX        4.2 cm                4.2 - 5.9 / 3.9 - 5.3 cm 

 LV Systolic Diameter PLAX         2.3 cm                 

 IVS Diastolic Thickness           0.9 cm                0.6 - 1.0 / 0.6 - 0.9 cm 

 LVPW Diastolic Thickness          0.9 cm                0.6 - 1.0 / 0.6 - 0.9 cm 

 LV Relative Wall Thickness        0.4                    

 RV Internal Dim ED PLAX           2.2 cm                 

 LVOT Diameter                     1.8 cm                 

 LA Volume                         45.1 cm???              18 - 58 / 22 - 52 cm??? 

 LA Volume Index                   23.5 cm???/m???           16 - 28 cm???/m??? 

 

 M-MODE 

 Aortic Root Diameter MM           2.7 cm                 

 LA Systolic Diameter MM           4.1 cm                 

 LA Ao Ratio MM                    1.5                    

 AV Cusp Separation MM             2.0 cm                 

 

 DOPPLER 

 AV Peak Velocity                  145.0 cm/s             

 AV Peak Gradient                  8.4 mmHg               

 AV Mean Velocity                  103.2 cm/s             

 AV Mean Gradient                  4.8 mmHg               

 AV Velocity Time Integral         30.2 cm                

 LVOT Peak Velocity                126.7 cm/s             

 LVOT Peak Gradient                6.4 mmHg               

 LVOT Velocity Time Integral       29.0 cm                

 LVOT Stroke Volume                74.5 cm???               

 LVOT Stroke Volume Index          40.0 ml/m???             

 LVOT Cardiac Index                2754.8 cm???/min???m???      

 AV Area Cont Eq vti               2.5 cm???                

 AV Area Cont Eq pk                2.2 cm???                

 MV Area PHT                       3.6 cm???                

 Mitral E Point Velocity           76.6 cm/s              

 Mitral A Point Velocity           126.7 cm/s             

 Mitral E to A Ratio               0.6                    

 MV Deceleration Time              213.2 ms               

 MV E' Velocity                    5.3 cm/s               

 Mitral E to MV E' Ratio           14.3                   

 TR Peak Velocity                  235.9 cm/s             

 TR Peak Gradient                  22.3 mmHg              

 Right Ventricular Systolic Press  26.5 mmHg              

 

 

 FINDINGS 

 Left Ventricle 

 Normal Left ventricular size, wall thickness, systolic function with no obvious  

 regional wall motion abnormalities. Normal Left ventricular diastolic filling  

 pattern. Left ventricular ejection fraction is estimated at 55-60 %. No obvious  

 regional wall motion abnormalities. 

 

 Right Ventricle 

 Normal right ventricular size and function. Right ventricular systolic pressure  

 within normal limits. 

 

 Right Atrium 

 Normal right atrial size. 

 

 Left Atrium 

 Normal left atrial size. 

 

 Mitral Valve 

 Structurally normal mitral valve. Mild mitral annular calcification. Mild  

 mitral regurgitation. 

 

 Aortic Valve 

 Trileaflet aortic valve. No aortic valve stenosis or regurgitation. 

 

 Tricuspid Valve 

 Structurally normal tricuspid valve. Mild tricuspid regurgitation. 

 

 Pulmonic Valve 

 Trace pulmonic regurgitation. 

 

 Pericardium 

 No pericardial effusion. 

 

 Aorta 

 Normal size aortic root and proximal ascending aorta 

 

 CONCLUSIONS 

 Left ventricular ejection fraction is estimated at 55-60 %. 

 No obvious regional wall motion abnormalities.  

 No significant valvular dysfunction 

 No pericardial effusion 

 Previewed by:  

 Dr Tony Aguirre 

 (Electronically Signed) 

 Final Date:      15 December 2023 17:56
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
## 2024-07-29 ENCOUNTER — HOSPITAL ENCOUNTER (OUTPATIENT)
Dept: HOSPITAL 47 - RADXRMAIN | Age: 84
Discharge: HOME | End: 2024-07-29
Payer: MEDICARE

## 2024-07-29 DIAGNOSIS — J45.30: Primary | ICD-10-CM

## 2024-07-29 PROCEDURE — 71046 X-RAY EXAM CHEST 2 VIEWS: CPT

## 2024-07-30 NOTE — XR
EXAMINATION TYPE: XR chest 2V

 

DATE OF EXAM: 7/29/2024

 

COMPARISON: 12/26/2020

 

HISTORY: Shortness of breath

 

TECHNIQUE:  Frontal and lateral views of the chest are obtained.

 

FINDINGS:

 

Scattered senescent parenchymal changes noted. Hyperinflation compatible with COPD. 

 

No evidence for infiltrate. No evidence for atelectasis.

 

Heart size is stable.

 

Mediastinal structures are stable and grossly unremarkable.

 

No evidence for hilar prominence.

 

Degenerative changes dorsal spine. 

 

IMPRESSION:

1. No evidence for acute pulmonary disease.

## 2024-10-31 ENCOUNTER — HOSPITAL ENCOUNTER (OUTPATIENT)
Dept: HOSPITAL 47 - RADXRMAIN | Age: 84
Discharge: HOME | End: 2024-10-31
Attending: INTERNAL MEDICINE
Payer: MEDICARE

## 2024-10-31 DIAGNOSIS — S20.219A: Primary | ICD-10-CM

## 2024-10-31 PROCEDURE — 71111 X-RAY EXAM RIBS/CHEST4/> VWS: CPT

## 2024-10-31 NOTE — XR
EXAMINATION TYPE: XR ribs bilat w pa chest xray

 

DATE OF EXAM: 10/31/2024 11:37 AM

 

COMPARISON: 7/29/2024 

 

CLINICAL INDICATION: Female, 84 years old with history of PAIN, , 

 

TECHNIQUE:  9 views

 

FINDINGS:  

Heart normal size. Mild interstitial prominence is unchanged. Hazy lower lung densities related to ov
erlying soft tissue. No consolidation or pleural effusion.

 

Appearance of old healed right anterolateral fifth, sixth, seventh, and eighth ribs.

 

On the left, laterally, subtle cortical irregularity involving the third, fourth, fifth, sixth ribs a
lso favor chronic.

 

No displaced rib fracture is seen on either side.

 

 

IMPRESSION:  

 

1. Chest: No acute cardiopulmonary process.

2. Right RIBS: Suspect old healed right anterolateral fifth, sixth, seventh, and eighth rib fracture 
deformities. Clinically correlate. No displaced right rib fracture seen.

3. Left RIBS: Suspect old healed fractures left lateral third through sixth ribs. Again, clinically c
orrelate. Otherwise, no displaced left rib fracture seen.

 

[facility], Workstation: [id number], 10/31/2024 12:02 PM

## 2025-03-04 ENCOUNTER — HOSPITAL ENCOUNTER (OUTPATIENT)
Dept: HOSPITAL 47 - RADMAMWWP | Age: 85
Discharge: HOME | End: 2025-03-04
Attending: INTERNAL MEDICINE
Payer: MEDICARE

## 2025-03-04 DIAGNOSIS — Z12.31: Primary | ICD-10-CM

## 2025-03-04 DIAGNOSIS — R92.333: ICD-10-CM

## 2025-03-04 DIAGNOSIS — Z78.0: ICD-10-CM

## 2025-03-04 DIAGNOSIS — Z80.3: ICD-10-CM

## 2025-03-04 PROCEDURE — 77067 SCR MAMMO BI INCL CAD: CPT

## 2025-03-04 PROCEDURE — 77063 BREAST TOMOSYNTHESIS BI: CPT

## 2025-03-04 NOTE — MM
Reason for Exam: Screening  (asymptomatic). 

Last mammogram was performed 1 year(s) and 4 month(s) ago. 





Patient History: 

Menarche at age 14. First Full-Term Pregnancy at age 33. Late child-bearing (after 32). Hysterectomy

at age 41. Postmenopausal. Patient has history of breast feeding. Patient used Estrogen for 5 years.

Patient used Hormonal Contraceptives for 10 years. Benign Excisional Biopsy on the right side. High

risk Excisional Biopsy on the left side. 





Risk Values: 

Alexa 5 year model risk: 3.2%.

NCI Lifetime model risk: 4.3%.





Prior Study Comparison: 

11/29/2021 Bilateral Screening Mammogram, [facility]. 11/15/2022 Bilateral MG 3D diag mammo w/cad ABELINO, [facility].

11/17/2023 Bilateral MG 3D screening mammo w/cad, [facility]. 





Tissue Density: 

The breasts are heterogeneously dense, which may obscure small masses.





Findings: 

Analyzed By CAD. 

Redemonstrated vascular calcifications on the right.

There is no suspicious group of microcalcifications or new suspicious mass in either breast. 





Overall Assessment: Benign, BI-RAD 2





Management: 

Screening Mammogram of both breasts in 1 year.

See note below in regards to the patient's increased 5 year Alexa score.



Patient should continue monthly self-breast exams.  A clinical breast exam by your physician is

recommended on an annual basis.

This exam should not preclude additional follow-up of suspicious palpable abnormalities.



Note on Alexa scores and lifetime risk:

1. A Alexa score greater than 3% is considered moderate risk. If this is the case, consider

specialist referral to assess eligibility for a risk reducing agent.

2. If overall lifetime risk for the development of breast cancer is 20% or higher, the patient may

qualify for future screening with alternating mammogram and breast MRI.









[facility], Workstation: [id number], 3/4/2025 5:47 PM.



Electronically signed and approved by: Fitz Priest M.D. Radiologist